# Patient Record
Sex: MALE | Race: WHITE | Employment: FULL TIME | URBAN - METROPOLITAN AREA
[De-identification: names, ages, dates, MRNs, and addresses within clinical notes are randomized per-mention and may not be internally consistent; named-entity substitution may affect disease eponyms.]

---

## 2019-04-07 ENCOUNTER — APPOINTMENT (OUTPATIENT)
Dept: ULTRASOUND IMAGING | Age: 39
End: 2019-04-07
Attending: PHYSICIAN ASSISTANT
Payer: COMMERCIAL

## 2019-04-07 ENCOUNTER — HOSPITAL ENCOUNTER (EMERGENCY)
Age: 39
Discharge: HOME | End: 2019-04-07
Attending: EMERGENCY MEDICINE | Admitting: EMERGENCY MEDICINE
Payer: COMMERCIAL

## 2019-04-07 VITALS
BODY MASS INDEX: 28.57 KG/M2 | TEMPERATURE: 99 F | DIASTOLIC BLOOD PRESSURE: 64 MMHG | OXYGEN SATURATION: 100 % | HEIGHT: 69 IN | RESPIRATION RATE: 12 BRPM | SYSTOLIC BLOOD PRESSURE: 104 MMHG | WEIGHT: 192.9 LBS | HEART RATE: 90 BPM

## 2019-04-07 DIAGNOSIS — L03.314 CELLULITIS OF RIGHT GROIN: Primary | ICD-10-CM

## 2019-04-07 DIAGNOSIS — I86.1 BILATERAL VARICOCELES: ICD-10-CM

## 2019-04-07 LAB
ALBUMIN SERPL-MCNC: 3.9 G/DL (ref 3.5–5)
ALBUMIN/GLOB SERPL: 1.1 {RATIO} (ref 1.1–2.2)
ALP SERPL-CCNC: 59 U/L (ref 45–117)
ALT SERPL-CCNC: 30 U/L (ref 12–78)
ANION GAP SERPL CALC-SCNC: 5 MMOL/L (ref 5–15)
APPEARANCE UR: CLEAR
AST SERPL-CCNC: 21 U/L (ref 15–37)
BACTERIA URNS QL MICRO: NEGATIVE /HPF
BASOPHILS # BLD: 0 K/UL (ref 0–0.1)
BASOPHILS NFR BLD: 0 % (ref 0–1)
BILIRUB SERPL-MCNC: 1.4 MG/DL (ref 0.2–1)
BILIRUB UR QL: NEGATIVE
BUN SERPL-MCNC: 10 MG/DL (ref 6–20)
BUN/CREAT SERPL: 10 (ref 12–20)
CALCIUM SERPL-MCNC: 8.7 MG/DL (ref 8.5–10.1)
CHLORIDE SERPL-SCNC: 106 MMOL/L (ref 97–108)
CO2 SERPL-SCNC: 27 MMOL/L (ref 21–32)
COLOR UR: NORMAL
CREAT SERPL-MCNC: 1.01 MG/DL (ref 0.7–1.3)
DIFFERENTIAL METHOD BLD: ABNORMAL
EOSINOPHIL # BLD: 0 K/UL (ref 0–0.4)
EOSINOPHIL NFR BLD: 0 % (ref 0–7)
EPITH CASTS URNS QL MICRO: NORMAL /LPF
ERYTHROCYTE [DISTWIDTH] IN BLOOD BY AUTOMATED COUNT: 14.6 % (ref 11.5–14.5)
GLOBULIN SER CALC-MCNC: 3.6 G/DL (ref 2–4)
GLUCOSE SERPL-MCNC: 95 MG/DL (ref 65–100)
GLUCOSE UR STRIP.AUTO-MCNC: NEGATIVE MG/DL
HCT VFR BLD AUTO: 42.1 % (ref 36.6–50.3)
HGB BLD-MCNC: 13.5 G/DL (ref 12.1–17)
HGB UR QL STRIP: NEGATIVE
IMM GRANULOCYTES # BLD AUTO: 0 K/UL (ref 0–0.04)
IMM GRANULOCYTES NFR BLD AUTO: 0 % (ref 0–0.5)
KETONES UR QL STRIP.AUTO: NEGATIVE MG/DL
LEUKOCYTE ESTERASE UR QL STRIP.AUTO: NEGATIVE
LYMPHOCYTES # BLD: 0.8 K/UL (ref 0.8–3.5)
LYMPHOCYTES NFR BLD: 7 % (ref 12–49)
MCH RBC QN AUTO: 29.5 PG (ref 26–34)
MCHC RBC AUTO-ENTMCNC: 32.1 G/DL (ref 30–36.5)
MCV RBC AUTO: 92.1 FL (ref 80–99)
MONOCYTES # BLD: 1.1 K/UL (ref 0–1)
MONOCYTES NFR BLD: 10 % (ref 5–13)
NEUTS SEG # BLD: 8.9 K/UL (ref 1.8–8)
NEUTS SEG NFR BLD: 83 % (ref 32–75)
NITRITE UR QL STRIP.AUTO: NEGATIVE
NRBC # BLD: 0 K/UL (ref 0–0.01)
NRBC BLD-RTO: 0 PER 100 WBC
PH UR STRIP: 7 [PH] (ref 5–8)
PLATELET # BLD AUTO: 186 K/UL (ref 150–400)
PMV BLD AUTO: 8.9 FL (ref 8.9–12.9)
POTASSIUM SERPL-SCNC: 4 MMOL/L (ref 3.5–5.1)
PROT SERPL-MCNC: 7.5 G/DL (ref 6.4–8.2)
PROT UR STRIP-MCNC: NEGATIVE MG/DL
RBC # BLD AUTO: 4.57 M/UL (ref 4.1–5.7)
RBC #/AREA URNS HPF: NORMAL /HPF (ref 0–5)
SODIUM SERPL-SCNC: 138 MMOL/L (ref 136–145)
SP GR UR REFRACTOMETRY: 1.01 (ref 1–1.03)
UA: UC IF INDICATED,UAUC: NORMAL
UROBILINOGEN UR QL STRIP.AUTO: 0.2 EU/DL (ref 0.2–1)
WBC # BLD AUTO: 10.8 K/UL (ref 4.1–11.1)
WBC URNS QL MICRO: NORMAL /HPF (ref 0–4)

## 2019-04-07 PROCEDURE — 80053 COMPREHEN METABOLIC PANEL: CPT

## 2019-04-07 PROCEDURE — 81001 URINALYSIS AUTO W/SCOPE: CPT

## 2019-04-07 PROCEDURE — 99283 EMERGENCY DEPT VISIT LOW MDM: CPT

## 2019-04-07 PROCEDURE — 76870 US EXAM SCROTUM: CPT

## 2019-04-07 PROCEDURE — 36415 COLL VENOUS BLD VENIPUNCTURE: CPT

## 2019-04-07 PROCEDURE — 87491 CHLMYD TRACH DNA AMP PROBE: CPT

## 2019-04-07 PROCEDURE — 85025 COMPLETE CBC W/AUTO DIFF WBC: CPT

## 2019-04-07 RX ORDER — SERTRALINE HYDROCHLORIDE 100 MG/1
TABLET, FILM COATED ORAL DAILY
COMMUNITY

## 2019-04-07 RX ORDER — CEPHALEXIN 500 MG/1
500 CAPSULE ORAL 4 TIMES DAILY
Qty: 40 CAP | Refills: 0 | Status: SHIPPED | OUTPATIENT
Start: 2019-04-07 | End: 2019-04-17

## 2019-04-07 RX ORDER — CETIRIZINE HCL 10 MG
TABLET ORAL
COMMUNITY

## 2019-04-07 RX ORDER — IBUPROFEN 800 MG/1
800 TABLET ORAL
Qty: 20 TAB | Refills: 0 | Status: SHIPPED | OUTPATIENT
Start: 2019-04-07 | End: 2019-04-14

## 2019-04-07 NOTE — ED PROVIDER NOTES
EMERGENCY DEPARTMENT HISTORY AND PHYSICAL EXAM      Date: 4/7/2019  Patient Name: Leigh Ann Johnson    History of Presenting Illness     Chief Complaint   Patient presents with    Groin Pain     Ambulatory into the ED with c/o pain, swelling, redness to Rt groin and penis x two days, worse yesterday. Denies testicular swelling.  Penis Pain       History Provided By: Patient    HPI: Leigh Ann Johnson, 41 y.o. male presents ambulatory to the ED with cc of 2 days of 3 out of 10 constant aching right groin pain, swelling and redness that is worse with palpation and not associated with fever or injury. He tells me the swelling and redness have moved to the right side of his penis, however denies any dysuria, urgency, frequency or urethral discharge. He denies any scrotal swelling or tenderness. He tells me he thought it was an infected hair and did not think much of it for the past couple of days, however his symptoms have persisted and now he is concerned. There are no other complaints, changes, or physical findings at this time. PCP: Betty Bean MD    Current Outpatient Medications   Medication Sig Dispense Refill    sertraline (ZOLOFT) 100 mg tablet Take  by mouth daily.  cetirizine (ZYRTEC) 10 mg tablet Take  by mouth.  cephALEXin (KEFLEX) 500 mg capsule Take 1 Cap by mouth four (4) times daily for 10 days. 40 Cap 0    ibuprofen (MOTRIN) 800 mg tablet Take 1 Tab by mouth every eight (8) hours as needed for Pain for up to 7 days. 20 Tab 0     Past History     Past Medical History:  No past medical history on file. Past Surgical History:  No past surgical history on file. Family History:  No family history on file. Social History:  Social History     Tobacco Use    Smoking status: Not on file   Substance Use Topics    Alcohol use: Not on file    Drug use: Not on file       Allergies:   Allergies   Allergen Reactions    Pcn [Penicillins] Rash     Review of Systems Review of Systems   Constitutional: Negative for fatigue and fever. HENT: Negative for congestion, ear pain and rhinorrhea. Eyes: Negative for pain and redness. Respiratory: Negative for cough and wheezing. Cardiovascular: Negative for chest pain and palpitations. Gastrointestinal: Negative for abdominal pain, nausea and vomiting. Genitourinary: Negative for dysuria, frequency and urgency. Musculoskeletal: Negative for back pain, neck pain and neck stiffness. Skin: Negative for rash and wound. Redness, pain and swelling of the right groin affecting the penis   Neurological: Negative for weakness, light-headedness, numbness and headaches. Physical Exam   Physical Exam   Constitutional: He is oriented to person, place, and time. He appears well-developed and well-nourished. Non-toxic appearance. No distress. HENT:   Head: Normocephalic and atraumatic. Head is without right periorbital erythema and without left periorbital erythema. Right Ear: External ear normal.   Left Ear: External ear normal.   Nose: Nose normal.   Mouth/Throat: Uvula is midline. No trismus in the jaw. Eyes: Pupils are equal, round, and reactive to light. Conjunctivae and EOM are normal. No scleral icterus. Neck: Normal range of motion and full passive range of motion without pain. Cardiovascular: Normal rate, regular rhythm and normal heart sounds. Pulmonary/Chest: Effort normal and breath sounds normal. No accessory muscle usage. No tachypnea. No respiratory distress. He has no decreased breath sounds. He has no wheezes. Abdominal: Soft. There is no tenderness. There is no rigidity and no guarding. Genitourinary:         Genitourinary Comments: Small area of tender redness without induration or fluctuance of the right groin. No definite swelling or redness of the penis. There is no penile tenderness. No scrotal redness, swelling or tenderness. Musculoskeletal: Normal range of motion. Neurological: He is alert and oriented to person, place, and time. He is not disoriented. No cranial nerve deficit or sensory deficit. GCS eye subscore is 4. GCS verbal subscore is 5. GCS motor subscore is 6. Skin: Skin is intact. No rash noted. Psychiatric: He has a normal mood and affect. His speech is normal.   Nursing note and vitals reviewed. Diagnostic Study Results     Labs -     Recent Results (from the past 12 hour(s))   URINALYSIS W/ REFLEX CULTURE    Collection Time: 04/07/19 11:42 AM   Result Value Ref Range    Color YELLOW/STRAW      Appearance CLEAR CLEAR      Specific gravity 1.011 1.003 - 1.030      pH (UA) 7.0 5.0 - 8.0      Protein NEGATIVE  NEG mg/dL    Glucose NEGATIVE  NEG mg/dL    Ketone NEGATIVE  NEG mg/dL    Bilirubin NEGATIVE  NEG      Blood NEGATIVE  NEG      Urobilinogen 0.2 0.2 - 1.0 EU/dL    Nitrites NEGATIVE  NEG      Leukocyte Esterase NEGATIVE  NEG      WBC 0-4 0 - 4 /hpf    RBC 0-5 0 - 5 /hpf    Epithelial cells FEW FEW /lpf    Bacteria NEGATIVE  NEG /hpf    UA:UC IF INDICATED CULTURE NOT INDICATED BY UA RESULT CNI     CBC WITH AUTOMATED DIFF    Collection Time: 04/07/19  1:17 PM   Result Value Ref Range    WBC 10.8 4.1 - 11.1 K/uL    RBC 4.57 4.10 - 5.70 M/uL    HGB 13.5 12.1 - 17.0 g/dL    HCT 42.1 36.6 - 50.3 %    MCV 92.1 80.0 - 99.0 FL    MCH 29.5 26.0 - 34.0 PG    MCHC 32.1 30.0 - 36.5 g/dL    RDW 14.6 (H) 11.5 - 14.5 %    PLATELET 126 655 - 393 K/uL    MPV 8.9 8.9 - 12.9 FL    NRBC 0.0 0  WBC    ABSOLUTE NRBC 0.00 0.00 - 0.01 K/uL    NEUTROPHILS 83 (H) 32 - 75 %    LYMPHOCYTES 7 (L) 12 - 49 %    MONOCYTES 10 5 - 13 %    EOSINOPHILS 0 0 - 7 %    BASOPHILS 0 0 - 1 %    IMMATURE GRANULOCYTES 0 0.0 - 0.5 %    ABS. NEUTROPHILS 8.9 (H) 1.8 - 8.0 K/UL    ABS. LYMPHOCYTES 0.8 0.8 - 3.5 K/UL    ABS. MONOCYTES 1.1 (H) 0.0 - 1.0 K/UL    ABS. EOSINOPHILS 0.0 0.0 - 0.4 K/UL    ABS. BASOPHILS 0.0 0.0 - 0.1 K/UL    ABS. IMM.  GRANS. 0.0 0.00 - 0.04 K/UL    DF AUTOMATED METABOLIC PANEL, COMPREHENSIVE    Collection Time: 04/07/19  1:17 PM   Result Value Ref Range    Sodium 138 136 - 145 mmol/L    Potassium 4.0 3.5 - 5.1 mmol/L    Chloride 106 97 - 108 mmol/L    CO2 27 21 - 32 mmol/L    Anion gap 5 5 - 15 mmol/L    Glucose 95 65 - 100 mg/dL    BUN 10 6 - 20 MG/DL    Creatinine 1.01 0.70 - 1.30 MG/DL    BUN/Creatinine ratio 10 (L) 12 - 20      GFR est AA >60 >60 ml/min/1.73m2    GFR est non-AA >60 >60 ml/min/1.73m2    Calcium 8.7 8.5 - 10.1 MG/DL    Bilirubin, total 1.4 (H) 0.2 - 1.0 MG/DL    ALT (SGPT) 30 12 - 78 U/L    AST (SGOT) 21 15 - 37 U/L    Alk. phosphatase 59 45 - 117 U/L    Protein, total 7.5 6.4 - 8.2 g/dL    Albumin 3.9 3.5 - 5.0 g/dL    Globulin 3.6 2.0 - 4.0 g/dL    A-G Ratio 1.1 1.1 - 2.2         Radiologic Studies -   US SCROTUM/TESTICLES   Final Result   IMPRESSION:      Bilateral testicular varicoceles. Nonemergent outpatient urology consultation is recommended. CT Results  (Last 48 hours)    None        CXR Results  (Last 48 hours)    None        Medical Decision Making   I am the first provider for this patient. I reviewed the vital signs, available nursing notes, past medical history, past surgical history, family history and social history. Vital Signs-Reviewed the patient's vital signs. Patient Vitals for the past 12 hrs:   Temp Pulse Resp BP SpO2   04/07/19 1300    104/64 100 %   04/07/19 1200    110/69 100 %   04/07/19 1158    112/70    04/07/19 1119 99 °F (37.2 °C) 90 12 (!) 149/99 100 %     Pulse Oximetry Analysis - 100% on RA    Records Reviewed: Nursing Notes, Old Medical Records, Previous Radiology Studies and Previous Laboratory Studies    Provider Notes (Medical Decision Making):   DDx: Cellulitis, abscess, hernia, varicocele, spermatocele, UTI, STI    ED Course:   Initial assessment performed.  The patients presenting problems have been discussed, and they are in agreement with the care plan formulated and outlined with them. I have encouraged them to ask questions as they arise throughout their visit. Disposition:  Charge    PLAN:  1. Discharge Medication List as of 4/7/2019  2:25 PM      START taking these medications    Details   cephALEXin (KEFLEX) 500 mg capsule Take 1 Cap by mouth four (4) times daily for 10 days. , Normal, Disp-40 Cap, R-0      ibuprofen (MOTRIN) 800 mg tablet Take 1 Tab by mouth every eight (8) hours as needed for Pain for up to 7 days. , Normal, Disp-20 Tab, R-0         CONTINUE these medications which have NOT CHANGED    Details   sertraline (ZOLOFT) 100 mg tablet Take  by mouth daily. , Historical Med      cetirizine (ZYRTEC) 10 mg tablet Take  by mouth., Historical Med           2. Follow-up Information     Follow up With Specialties Details Why Contact Info    Leeann Wood MD [facility] Schedule an appointment as soon as possible for a visit PRIMARY CARE: follow up in 2 days for a wound check [id number] [facility]  [phone]      Ashlee Velásquez MD Urology Schedule an appointment as soon as possible for a visit UROLOGY: for any concerns regarding your diagnosis of varicoceles [id number] [address] [place] [address]  [phone]          Return to ED if worse     Diagnosis     Clinical Impression:   1. Cellulitis of right groin    2.  Bilateral varicoceles

## 2019-04-07 NOTE — ED NOTES
Jessica GALDAMEZ reviewed discharge instructions with the patient. The patient verbalized understanding. All questions and concerns were addressed. The patient declined a wheelchair and is discharged ambulatory in the care of family members with instructions and prescriptions in hand. Pt is alert and oriented x 4. Respirations are clear and unlabored.

## 2019-04-07 NOTE — ED NOTES
Patient presents with groin pain for approximately 3-4 days. States the pain started off as an ingrown hair feeling, but has progressed and he now has swelling and pain to the penis as well. Patient denies urinary symptoms and penile discharge. Patient states he had sexual intercourse with his wife on Tuesday. He states he goes to the gym multiple times a week and recently replaced a door.

## 2019-04-07 NOTE — LETTER
[facility] EMERGENCY DEPT 
[address] [place] [address] [address] [id number] [phone] Work/School Note Date: 4/7/2019 To Whom It May concern: 
 
Mario Lofton was seen and treated today in the emergency room by the following provider(s): 
Attending Provider: Clark Marte MD 
Physician Assistant: RUDOLPH Valladares. Mario Lofton may return to work on [id number]. Sincerely, RUDOLPH Moise

## 2019-04-08 LAB
C TRACH DNA SPEC QL NAA+PROBE: NEGATIVE
N GONORRHOEA DNA SPEC QL NAA+PROBE: NEGATIVE
SAMPLE TYPE: NORMAL
SERVICE CMNT-IMP: NORMAL
SPECIMEN SOURCE: NORMAL

## 2021-12-20 PROCEDURE — 96376 TX/PRO/DX INJ SAME DRUG ADON: CPT

## 2021-12-20 PROCEDURE — 99284 EMERGENCY DEPT VISIT MOD MDM: CPT

## 2021-12-20 PROCEDURE — 96374 THER/PROPH/DIAG INJ IV PUSH: CPT

## 2021-12-20 PROCEDURE — 96375 TX/PRO/DX INJ NEW DRUG ADDON: CPT

## 2021-12-21 ENCOUNTER — ANESTHESIA EVENT (OUTPATIENT)
Dept: SURGERY | Age: 41
End: 2021-12-21
Payer: COMMERCIAL

## 2021-12-21 ENCOUNTER — ANESTHESIA (OUTPATIENT)
Dept: SURGERY | Age: 41
End: 2021-12-21
Payer: COMMERCIAL

## 2021-12-21 ENCOUNTER — APPOINTMENT (OUTPATIENT)
Dept: GENERAL RADIOLOGY | Age: 41
End: 2021-12-21
Attending: SURGERY
Payer: COMMERCIAL

## 2021-12-21 ENCOUNTER — HOSPITAL ENCOUNTER (OUTPATIENT)
Age: 41
Setting detail: OBSERVATION
Discharge: HOME | End: 2021-12-23
Attending: STUDENT IN AN ORGANIZED HEALTH CARE EDUCATION/TRAINING PROGRAM | Admitting: SURGERY
Payer: COMMERCIAL

## 2021-12-21 ENCOUNTER — APPOINTMENT (OUTPATIENT)
Dept: ULTRASOUND IMAGING | Age: 41
End: 2021-12-21
Attending: STUDENT IN AN ORGANIZED HEALTH CARE EDUCATION/TRAINING PROGRAM
Payer: COMMERCIAL

## 2021-12-21 DIAGNOSIS — K81.0 ACUTE CHOLECYSTITIS: Primary | ICD-10-CM

## 2021-12-21 LAB
ALBUMIN SERPL-MCNC: 4 G/DL (ref 3.5–5)
ALBUMIN/GLOB SERPL: 1.3 {RATIO} (ref 1.1–2.2)
ALP SERPL-CCNC: 104 U/L (ref 45–117)
ALT SERPL-CCNC: 190 U/L (ref 12–78)
ANION GAP SERPL CALC-SCNC: 7 MMOL/L (ref 5–15)
APPEARANCE UR: ABNORMAL
AST SERPL-CCNC: 277 U/L (ref 15–37)
BACTERIA URNS QL MICRO: NEGATIVE /HPF
BASOPHILS # BLD: 0 K/UL (ref 0–0.1)
BASOPHILS NFR BLD: 0 % (ref 0–1)
BILIRUB SERPL-MCNC: 1.5 MG/DL (ref 0.2–1)
BILIRUB UR QL: NEGATIVE
BUN SERPL-MCNC: 14 MG/DL (ref 6–20)
BUN/CREAT SERPL: 12 (ref 12–20)
CALCIUM SERPL-MCNC: 9.5 MG/DL (ref 8.5–10.1)
CHLORIDE SERPL-SCNC: 105 MMOL/L (ref 97–108)
CO2 SERPL-SCNC: 28 MMOL/L (ref 21–32)
COLOR UR: ABNORMAL
COVID-19 RAPID TEST, COVR: NOT DETECTED
CREAT SERPL-MCNC: 1.13 MG/DL (ref 0.7–1.3)
DIFFERENTIAL METHOD BLD: ABNORMAL
EOSINOPHIL # BLD: 0.2 K/UL (ref 0–0.4)
EOSINOPHIL NFR BLD: 1 % (ref 0–7)
EPITH CASTS URNS QL MICRO: ABNORMAL /LPF
ERYTHROCYTE [DISTWIDTH] IN BLOOD BY AUTOMATED COUNT: 14.9 % (ref 11.5–14.5)
GLOBULIN SER CALC-MCNC: 3.1 G/DL (ref 2–4)
GLUCOSE SERPL-MCNC: 185 MG/DL (ref 65–100)
GLUCOSE UR STRIP.AUTO-MCNC: 250 MG/DL
HCT VFR BLD AUTO: 42.6 % (ref 36.6–50.3)
HGB BLD-MCNC: 13.6 G/DL (ref 12.1–17)
HGB UR QL STRIP: NEGATIVE
HYALINE CASTS URNS QL MICRO: ABNORMAL /LPF (ref 0–5)
IMM GRANULOCYTES # BLD AUTO: 0.1 K/UL (ref 0–0.04)
IMM GRANULOCYTES NFR BLD AUTO: 0 % (ref 0–0.5)
KETONES UR QL STRIP.AUTO: ABNORMAL MG/DL
LEUKOCYTE ESTERASE UR QL STRIP.AUTO: NEGATIVE
LIPASE SERPL-CCNC: 160 U/L (ref 73–393)
LYMPHOCYTES # BLD: 1.9 K/UL (ref 0.8–3.5)
LYMPHOCYTES NFR BLD: 14 % (ref 12–49)
MCH RBC QN AUTO: 29.1 PG (ref 26–34)
MCHC RBC AUTO-ENTMCNC: 31.9 G/DL (ref 30–36.5)
MCV RBC AUTO: 91.2 FL (ref 80–99)
MONOCYTES # BLD: 1.1 K/UL (ref 0–1)
MONOCYTES NFR BLD: 8 % (ref 5–13)
NEUTS SEG # BLD: 10.4 K/UL (ref 1.8–8)
NEUTS SEG NFR BLD: 77 % (ref 32–75)
NITRITE UR QL STRIP.AUTO: NEGATIVE
NRBC # BLD: 0 K/UL (ref 0–0.01)
NRBC BLD-RTO: 0 PER 100 WBC
PH UR STRIP: 8 [PH] (ref 5–8)
PLATELET # BLD AUTO: 198 K/UL (ref 150–400)
PMV BLD AUTO: 9.7 FL (ref 8.9–12.9)
POTASSIUM SERPL-SCNC: 3.5 MMOL/L (ref 3.5–5.1)
PROT SERPL-MCNC: 7.1 G/DL (ref 6.4–8.2)
PROT UR STRIP-MCNC: 30 MG/DL
RBC # BLD AUTO: 4.67 M/UL (ref 4.1–5.7)
RBC #/AREA URNS HPF: ABNORMAL /HPF (ref 0–5)
SODIUM SERPL-SCNC: 140 MMOL/L (ref 136–145)
SOURCE, COVRS: NORMAL
SP GR UR REFRACTOMETRY: 1.02 (ref 1–1.03)
TROPONIN-HIGH SENSITIVITY: 4 NG/L (ref 0–76)
UROBILINOGEN UR QL STRIP.AUTO: 1 EU/DL (ref 0.2–1)
WBC # BLD AUTO: 13.6 K/UL (ref 4.1–11.1)
WBC URNS QL MICRO: ABNORMAL /HPF (ref 0–4)

## 2021-12-21 PROCEDURE — 74011250636 HC RX REV CODE- 250/636: Performed by: STUDENT IN AN ORGANIZED HEALTH CARE EDUCATION/TRAINING PROGRAM

## 2021-12-21 PROCEDURE — 80053 COMPREHEN METABOLIC PANEL: CPT

## 2021-12-21 PROCEDURE — 74011000250 HC RX REV CODE- 250: Performed by: NURSE ANESTHETIST, CERTIFIED REGISTERED

## 2021-12-21 PROCEDURE — 47563 LAPARO CHOLECYSTECTOMY/GRAPH: CPT | Performed by: SURGERY

## 2021-12-21 PROCEDURE — G0378 HOSPITAL OBSERVATION PER HR: HCPCS

## 2021-12-21 PROCEDURE — 77030009403 HC ELECTRD ENDO MEGA -B: Performed by: SURGERY

## 2021-12-21 PROCEDURE — 96376 TX/PRO/DX INJ SAME DRUG ADON: CPT

## 2021-12-21 PROCEDURE — 76705 ECHO EXAM OF ABDOMEN: CPT

## 2021-12-21 PROCEDURE — 77030004813 HC CATH CHOLGM TELE -B: Performed by: SURGERY

## 2021-12-21 PROCEDURE — 77030002933 HC SUT MCRYL J&J -A: Performed by: SURGERY

## 2021-12-21 PROCEDURE — 74011250636 HC RX REV CODE- 250/636: Performed by: SURGERY

## 2021-12-21 PROCEDURE — 76010000149 HC OR TIME 1 TO 1.5 HR: Performed by: SURGERY

## 2021-12-21 PROCEDURE — 77030010513 HC APPL CLP LIG J&J -C: Performed by: SURGERY

## 2021-12-21 PROCEDURE — 74300 X-RAY BILE DUCTS/PANCREAS: CPT

## 2021-12-21 PROCEDURE — 74011000250 HC RX REV CODE- 250: Performed by: SURGERY

## 2021-12-21 PROCEDURE — 74011000636 HC RX REV CODE- 636: Performed by: SURGERY

## 2021-12-21 PROCEDURE — 77030013079 HC BLNKT BAIR HGGR 3M -A: Performed by: ANESTHESIOLOGY

## 2021-12-21 PROCEDURE — 81001 URINALYSIS AUTO W/SCOPE: CPT

## 2021-12-21 PROCEDURE — 74011250636 HC RX REV CODE- 250/636: Performed by: NURSE ANESTHETIST, CERTIFIED REGISTERED

## 2021-12-21 PROCEDURE — 77030026438 HC STYL ET INTUB CARD -A: Performed by: ANESTHESIOLOGY

## 2021-12-21 PROCEDURE — 85025 COMPLETE CBC W/AUTO DIFF WBC: CPT

## 2021-12-21 PROCEDURE — 76060000033 HC ANESTHESIA 1 TO 1.5 HR: Performed by: SURGERY

## 2021-12-21 PROCEDURE — 2709999900 HC NON-CHARGEABLE SUPPLY: Performed by: SURGERY

## 2021-12-21 PROCEDURE — 84484 ASSAY OF TROPONIN QUANT: CPT

## 2021-12-21 PROCEDURE — 77030040361 HC SLV COMPR DVT MDII -B: Performed by: SURGERY

## 2021-12-21 PROCEDURE — 77030019908 HC STETH ESOPH SIMS -A: Performed by: ANESTHESIOLOGY

## 2021-12-21 PROCEDURE — 96374 THER/PROPH/DIAG INJ IV PUSH: CPT

## 2021-12-21 PROCEDURE — 77030002895 HC DEV VASC CLOSR COVD -B: Performed by: SURGERY

## 2021-12-21 PROCEDURE — 83690 ASSAY OF LIPASE: CPT

## 2021-12-21 PROCEDURE — 99218 PR INITIAL OBSERVATION CARE/DAY 30 MINUTES: CPT | Performed by: SURGERY

## 2021-12-21 PROCEDURE — 77030008684 HC TU ET CUF COVD -B: Performed by: ANESTHESIOLOGY

## 2021-12-21 PROCEDURE — 76210000006 HC OR PH I REC 0.5 TO 1 HR: Performed by: SURGERY

## 2021-12-21 PROCEDURE — 88304 TISSUE EXAM BY PATHOLOGIST: CPT

## 2021-12-21 PROCEDURE — 74011250637 HC RX REV CODE- 250/637: Performed by: STUDENT IN AN ORGANIZED HEALTH CARE EDUCATION/TRAINING PROGRAM

## 2021-12-21 PROCEDURE — 77030007955 HC PCH ENDOSC SPEC J&J -B: Performed by: SURGERY

## 2021-12-21 PROCEDURE — 77030008756 HC TU IRR SUC STRY -B: Performed by: SURGERY

## 2021-12-21 PROCEDURE — 93005 ELECTROCARDIOGRAM TRACING: CPT

## 2021-12-21 PROCEDURE — 87635 SARS-COV-2 COVID-19 AMP PRB: CPT

## 2021-12-21 PROCEDURE — 77030020829: Performed by: SURGERY

## 2021-12-21 PROCEDURE — 77030008606 HC TRCR ENDOSC KII AMR -B: Performed by: SURGERY

## 2021-12-21 PROCEDURE — 77030012770 HC TRCR OPT FX AMR -B: Performed by: SURGERY

## 2021-12-21 PROCEDURE — 36415 COLL VENOUS BLD VENIPUNCTURE: CPT

## 2021-12-21 PROCEDURE — 77030008771 HC TU NG SALEM SUMP -A: Performed by: ANESTHESIOLOGY

## 2021-12-21 PROCEDURE — 96375 TX/PRO/DX INJ NEW DRUG ADDON: CPT

## 2021-12-21 PROCEDURE — 96361 HYDRATE IV INFUSION ADD-ON: CPT

## 2021-12-21 RX ORDER — LORAZEPAM 2 MG/ML
1 INJECTION INTRAMUSCULAR
Status: DISCONTINUED | OUTPATIENT
Start: 2021-12-21 | End: 2021-12-23 | Stop reason: HOSPADM

## 2021-12-21 RX ORDER — SODIUM CHLORIDE 0.9 % (FLUSH) 0.9 %
5-40 SYRINGE (ML) INJECTION EVERY 8 HOURS
Status: DISCONTINUED | OUTPATIENT
Start: 2021-12-21 | End: 2021-12-22 | Stop reason: HOSPADM

## 2021-12-21 RX ORDER — LEVOFLOXACIN 5 MG/ML
500 INJECTION, SOLUTION INTRAVENOUS EVERY 24 HOURS
Status: DISCONTINUED | OUTPATIENT
Start: 2021-12-21 | End: 2021-12-21

## 2021-12-21 RX ORDER — SODIUM CHLORIDE 0.9 % (FLUSH) 0.9 %
5-40 SYRINGE (ML) INJECTION AS NEEDED
Status: DISCONTINUED | OUTPATIENT
Start: 2021-12-21 | End: 2021-12-22 | Stop reason: HOSPADM

## 2021-12-21 RX ORDER — SUCCINYLCHOLINE CHLORIDE 20 MG/ML
INJECTION INTRAMUSCULAR; INTRAVENOUS AS NEEDED
Status: DISCONTINUED | OUTPATIENT
Start: 2021-12-21 | End: 2021-12-21 | Stop reason: HOSPADM

## 2021-12-21 RX ORDER — DEXAMETHASONE SODIUM PHOSPHATE 4 MG/ML
INJECTION, SOLUTION INTRA-ARTICULAR; INTRALESIONAL; INTRAMUSCULAR; INTRAVENOUS; SOFT TISSUE AS NEEDED
Status: DISCONTINUED | OUTPATIENT
Start: 2021-12-21 | End: 2021-12-21 | Stop reason: HOSPADM

## 2021-12-21 RX ORDER — SODIUM CHLORIDE 0.9 % (FLUSH) 0.9 %
5-40 SYRINGE (ML) INJECTION EVERY 8 HOURS
Status: DISCONTINUED | OUTPATIENT
Start: 2021-12-21 | End: 2021-12-21 | Stop reason: HOSPADM

## 2021-12-21 RX ORDER — SODIUM CHLORIDE, SODIUM LACTATE, POTASSIUM CHLORIDE, CALCIUM CHLORIDE 600; 310; 30; 20 MG/100ML; MG/100ML; MG/100ML; MG/100ML
25 INJECTION, SOLUTION INTRAVENOUS CONTINUOUS
Status: DISCONTINUED | OUTPATIENT
Start: 2021-12-21 | End: 2021-12-21 | Stop reason: HOSPADM

## 2021-12-21 RX ORDER — ONDANSETRON 2 MG/ML
4 INJECTION INTRAMUSCULAR; INTRAVENOUS
Status: DISCONTINUED | OUTPATIENT
Start: 2021-12-21 | End: 2021-12-23 | Stop reason: HOSPADM

## 2021-12-21 RX ORDER — ONDANSETRON 2 MG/ML
4 INJECTION INTRAMUSCULAR; INTRAVENOUS
Status: COMPLETED | OUTPATIENT
Start: 2021-12-21 | End: 2021-12-21

## 2021-12-21 RX ORDER — GLYCOPYRROLATE 0.2 MG/ML
INJECTION INTRAMUSCULAR; INTRAVENOUS AS NEEDED
Status: DISCONTINUED | OUTPATIENT
Start: 2021-12-21 | End: 2021-12-21 | Stop reason: HOSPADM

## 2021-12-21 RX ORDER — HYDROMORPHONE HYDROCHLORIDE 1 MG/ML
0.5 INJECTION, SOLUTION INTRAMUSCULAR; INTRAVENOUS; SUBCUTANEOUS ONCE
Status: COMPLETED | OUTPATIENT
Start: 2021-12-21 | End: 2021-12-21

## 2021-12-21 RX ORDER — SODIUM CHLORIDE, SODIUM LACTATE, POTASSIUM CHLORIDE, CALCIUM CHLORIDE 600; 310; 30; 20 MG/100ML; MG/100ML; MG/100ML; MG/100ML
25 INJECTION, SOLUTION INTRAVENOUS CONTINUOUS
Status: DISCONTINUED | OUTPATIENT
Start: 2021-12-21 | End: 2021-12-22 | Stop reason: HOSPADM

## 2021-12-21 RX ORDER — DIPHENHYDRAMINE HYDROCHLORIDE 50 MG/ML
12.5 INJECTION, SOLUTION INTRAMUSCULAR; INTRAVENOUS AS NEEDED
Status: DISCONTINUED | OUTPATIENT
Start: 2021-12-21 | End: 2021-12-21 | Stop reason: HOSPADM

## 2021-12-21 RX ORDER — HYDROMORPHONE HYDROCHLORIDE 2 MG/ML
INJECTION, SOLUTION INTRAMUSCULAR; INTRAVENOUS; SUBCUTANEOUS AS NEEDED
Status: DISCONTINUED | OUTPATIENT
Start: 2021-12-21 | End: 2021-12-21 | Stop reason: HOSPADM

## 2021-12-21 RX ORDER — MORPHINE SULFATE 2 MG/ML
4 INJECTION, SOLUTION INTRAMUSCULAR; INTRAVENOUS ONCE
Status: COMPLETED | OUTPATIENT
Start: 2021-12-21 | End: 2021-12-21

## 2021-12-21 RX ORDER — LIDOCAINE HYDROCHLORIDE 20 MG/ML
INJECTION, SOLUTION EPIDURAL; INFILTRATION; INTRACAUDAL; PERINEURAL AS NEEDED
Status: DISCONTINUED | OUTPATIENT
Start: 2021-12-21 | End: 2021-12-21 | Stop reason: HOSPADM

## 2021-12-21 RX ORDER — ONDANSETRON 2 MG/ML
4 INJECTION INTRAMUSCULAR; INTRAVENOUS AS NEEDED
Status: DISCONTINUED | OUTPATIENT
Start: 2021-12-21 | End: 2021-12-21 | Stop reason: HOSPADM

## 2021-12-21 RX ORDER — PROPOFOL 10 MG/ML
INJECTION, EMULSION INTRAVENOUS AS NEEDED
Status: DISCONTINUED | OUTPATIENT
Start: 2021-12-21 | End: 2021-12-21 | Stop reason: HOSPADM

## 2021-12-21 RX ORDER — LIDOCAINE HYDROCHLORIDE 10 MG/ML
0.1 INJECTION, SOLUTION EPIDURAL; INFILTRATION; INTRACAUDAL; PERINEURAL AS NEEDED
Status: DISCONTINUED | OUTPATIENT
Start: 2021-12-21 | End: 2021-12-22 | Stop reason: HOSPADM

## 2021-12-21 RX ORDER — KETOROLAC TROMETHAMINE 30 MG/ML
INJECTION, SOLUTION INTRAMUSCULAR; INTRAVENOUS AS NEEDED
Status: DISCONTINUED | OUTPATIENT
Start: 2021-12-21 | End: 2021-12-21 | Stop reason: HOSPADM

## 2021-12-21 RX ORDER — FENTANYL CITRATE 50 UG/ML
INJECTION, SOLUTION INTRAMUSCULAR; INTRAVENOUS AS NEEDED
Status: DISCONTINUED | OUTPATIENT
Start: 2021-12-21 | End: 2021-12-21 | Stop reason: HOSPADM

## 2021-12-21 RX ORDER — FENTANYL CITRATE 50 UG/ML
25 INJECTION, SOLUTION INTRAMUSCULAR; INTRAVENOUS
Status: DISCONTINUED | OUTPATIENT
Start: 2021-12-21 | End: 2021-12-21 | Stop reason: HOSPADM

## 2021-12-21 RX ORDER — MORPHINE SULFATE 2 MG/ML
2 INJECTION, SOLUTION INTRAMUSCULAR; INTRAVENOUS
Status: DISCONTINUED | OUTPATIENT
Start: 2021-12-21 | End: 2021-12-21 | Stop reason: HOSPADM

## 2021-12-21 RX ORDER — ACETAMINOPHEN 325 MG/1
650 TABLET ORAL
Status: DISCONTINUED | OUTPATIENT
Start: 2021-12-21 | End: 2021-12-23 | Stop reason: HOSPADM

## 2021-12-21 RX ORDER — HYDROMORPHONE HYDROCHLORIDE 1 MG/ML
.2-.5 INJECTION, SOLUTION INTRAMUSCULAR; INTRAVENOUS; SUBCUTANEOUS
Status: DISCONTINUED | OUTPATIENT
Start: 2021-12-21 | End: 2021-12-21 | Stop reason: HOSPADM

## 2021-12-21 RX ORDER — SODIUM CHLORIDE 0.9 % (FLUSH) 0.9 %
5-40 SYRINGE (ML) INJECTION AS NEEDED
Status: DISCONTINUED | OUTPATIENT
Start: 2021-12-21 | End: 2021-12-21 | Stop reason: HOSPADM

## 2021-12-21 RX ORDER — ROCURONIUM BROMIDE 10 MG/ML
INJECTION, SOLUTION INTRAVENOUS AS NEEDED
Status: DISCONTINUED | OUTPATIENT
Start: 2021-12-21 | End: 2021-12-21 | Stop reason: HOSPADM

## 2021-12-21 RX ORDER — MIDAZOLAM HYDROCHLORIDE 1 MG/ML
INJECTION, SOLUTION INTRAMUSCULAR; INTRAVENOUS AS NEEDED
Status: DISCONTINUED | OUTPATIENT
Start: 2021-12-21 | End: 2021-12-21 | Stop reason: HOSPADM

## 2021-12-21 RX ORDER — DEXTROSE, SODIUM CHLORIDE, AND POTASSIUM CHLORIDE 5; .45; .15 G/100ML; G/100ML; G/100ML
100 INJECTION INTRAVENOUS CONTINUOUS
Status: DISCONTINUED | OUTPATIENT
Start: 2021-12-21 | End: 2021-12-23 | Stop reason: HOSPADM

## 2021-12-21 RX ORDER — BUPIVACAINE HYDROCHLORIDE 5 MG/ML
INJECTION, SOLUTION EPIDURAL; INTRACAUDAL AS NEEDED
Status: DISCONTINUED | OUTPATIENT
Start: 2021-12-21 | End: 2021-12-21 | Stop reason: HOSPADM

## 2021-12-21 RX ORDER — LEVOFLOXACIN 5 MG/ML
750 INJECTION, SOLUTION INTRAVENOUS EVERY 24 HOURS
Status: DISCONTINUED | OUTPATIENT
Start: 2021-12-21 | End: 2021-12-23 | Stop reason: HOSPADM

## 2021-12-21 RX ORDER — SODIUM CHLORIDE, SODIUM LACTATE, POTASSIUM CHLORIDE, CALCIUM CHLORIDE 600; 310; 30; 20 MG/100ML; MG/100ML; MG/100ML; MG/100ML
INJECTION, SOLUTION INTRAVENOUS
Status: DISCONTINUED | OUTPATIENT
Start: 2021-12-21 | End: 2021-12-21 | Stop reason: HOSPADM

## 2021-12-21 RX ORDER — HYDROMORPHONE HYDROCHLORIDE 1 MG/ML
0.5 INJECTION, SOLUTION INTRAMUSCULAR; INTRAVENOUS; SUBCUTANEOUS
Status: DISCONTINUED | OUTPATIENT
Start: 2021-12-21 | End: 2021-12-23 | Stop reason: HOSPADM

## 2021-12-21 RX ORDER — ONDANSETRON 2 MG/ML
INJECTION INTRAMUSCULAR; INTRAVENOUS AS NEEDED
Status: DISCONTINUED | OUTPATIENT
Start: 2021-12-21 | End: 2021-12-21 | Stop reason: HOSPADM

## 2021-12-21 RX ORDER — NEOSTIGMINE METHYLSULFATE 1 MG/ML
INJECTION, SOLUTION INTRAVENOUS AS NEEDED
Status: DISCONTINUED | OUTPATIENT
Start: 2021-12-21 | End: 2021-12-21 | Stop reason: HOSPADM

## 2021-12-21 RX ORDER — OXYCODONE HYDROCHLORIDE 5 MG/1
5 TABLET ORAL
Status: DISCONTINUED | OUTPATIENT
Start: 2021-12-21 | End: 2021-12-22

## 2021-12-21 RX ADMIN — ACETAMINOPHEN 650 MG: 325 TABLET ORAL at 13:37

## 2021-12-21 RX ADMIN — KETOROLAC TROMETHAMINE 30 MG: 30 INJECTION, SOLUTION INTRAMUSCULAR; INTRAVENOUS at 16:21

## 2021-12-21 RX ADMIN — ONDANSETRON 4 MG: 2 INJECTION INTRAMUSCULAR; INTRAVENOUS at 00:40

## 2021-12-21 RX ADMIN — GLUCAGON HYDROCHLORIDE 1 MG: KIT at 15:59

## 2021-12-21 RX ADMIN — LEVOFLOXACIN 750 MG: 5 INJECTION, SOLUTION INTRAVENOUS at 15:28

## 2021-12-21 RX ADMIN — Medication 3 MG: at 16:15

## 2021-12-21 RX ADMIN — DEXAMETHASONE SODIUM PHOSPHATE 4 MG: 4 INJECTION, SOLUTION INTRAMUSCULAR; INTRAVENOUS at 15:30

## 2021-12-21 RX ADMIN — HYDROMORPHONE HYDROCHLORIDE 0.4 MG: 2 INJECTION, SOLUTION INTRAMUSCULAR; INTRAVENOUS; SUBCUTANEOUS at 15:36

## 2021-12-21 RX ADMIN — HYDROMORPHONE HYDROCHLORIDE 0.2 MG: 2 INJECTION, SOLUTION INTRAMUSCULAR; INTRAVENOUS; SUBCUTANEOUS at 15:21

## 2021-12-21 RX ADMIN — SODIUM CHLORIDE, POTASSIUM CHLORIDE, SODIUM LACTATE AND CALCIUM CHLORIDE: 600; 310; 30; 20 INJECTION, SOLUTION INTRAVENOUS at 15:21

## 2021-12-21 RX ADMIN — MIDAZOLAM HYDROCHLORIDE 2 MG: 1 INJECTION, SOLUTION INTRAMUSCULAR; INTRAVENOUS at 15:21

## 2021-12-21 RX ADMIN — GLYCOPYRROLATE 0.5 MG: 0.2 INJECTION, SOLUTION INTRAMUSCULAR; INTRAVENOUS at 16:15

## 2021-12-21 RX ADMIN — LEVOFLOXACIN 750 MG: 5 INJECTION, SOLUTION INTRAVENOUS at 04:04

## 2021-12-21 RX ADMIN — POTASSIUM CHLORIDE, DEXTROSE MONOHYDRATE AND SODIUM CHLORIDE 125 ML/HR: 150; 5; 450 INJECTION, SOLUTION INTRAVENOUS at 02:49

## 2021-12-21 RX ADMIN — SUCCINYLCHOLINE CHLORIDE 140 MG: 20 INJECTION, SOLUTION INTRAMUSCULAR; INTRAVENOUS at 15:26

## 2021-12-21 RX ADMIN — SODIUM CHLORIDE, POTASSIUM CHLORIDE, SODIUM LACTATE AND CALCIUM CHLORIDE 500 ML: 600; 310; 30; 20 INJECTION, SOLUTION INTRAVENOUS at 00:41

## 2021-12-21 RX ADMIN — PROPOFOL 250 MG: 10 INJECTION, EMULSION INTRAVENOUS at 15:24

## 2021-12-21 RX ADMIN — PHENYLEPHRINE HYDROCHLORIDE 50 MCG: 10 INJECTION INTRAVENOUS at 15:44

## 2021-12-21 RX ADMIN — FENTANYL CITRATE 25 MCG: 50 INJECTION, SOLUTION INTRAMUSCULAR; INTRAVENOUS at 15:24

## 2021-12-21 RX ADMIN — HYDROMORPHONE HYDROCHLORIDE 0.5 MG: 1 INJECTION, SOLUTION INTRAMUSCULAR; INTRAVENOUS; SUBCUTANEOUS at 01:48

## 2021-12-21 RX ADMIN — ROCURONIUM BROMIDE 10 MG: 10 INJECTION INTRAVENOUS at 15:24

## 2021-12-21 RX ADMIN — ONDANSETRON HYDROCHLORIDE 4 MG: 2 INJECTION, SOLUTION INTRAMUSCULAR; INTRAVENOUS at 16:15

## 2021-12-21 RX ADMIN — HYDROMORPHONE HYDROCHLORIDE 0.5 MG: 1 INJECTION, SOLUTION INTRAMUSCULAR; INTRAVENOUS; SUBCUTANEOUS at 02:18

## 2021-12-21 RX ADMIN — ROCURONIUM BROMIDE 20 MG: 10 INJECTION INTRAVENOUS at 15:35

## 2021-12-21 RX ADMIN — POTASSIUM CHLORIDE, DEXTROSE MONOHYDRATE AND SODIUM CHLORIDE 125 ML/HR: 150; 5; 450 INJECTION, SOLUTION INTRAVENOUS at 19:09

## 2021-12-21 RX ADMIN — LIDOCAINE HYDROCHLORIDE 100 MG: 20 INJECTION, SOLUTION INTRAVENOUS at 15:24

## 2021-12-21 RX ADMIN — HYDROMORPHONE HYDROCHLORIDE 0.5 MG: 1 INJECTION, SOLUTION INTRAMUSCULAR; INTRAVENOUS; SUBCUTANEOUS at 06:35

## 2021-12-21 RX ADMIN — MORPHINE SULFATE 4 MG: 2 INJECTION, SOLUTION INTRAMUSCULAR; INTRAVENOUS at 00:42

## 2021-12-21 RX ADMIN — Medication 10 ML: at 22:33

## 2021-12-21 NOTE — H&P
Subjective:      Patient presents to the ER with severe RUQ pain that occurred after eating cheese crackers. Pain is sharp and stabbing and rated 10/10. Nothing makes the pain better. Contact makes the pain worse. Pain is associated with nausea. Patient denies fevers, chills, SOB, cough, and xhest pain. Patient has a 6 month history of progressive biliary symptoms. History reviewed. No pertinent past medical history. History reviewed. No pertinent family history. Prior to Admission Medications   Prescriptions Last Dose Informant Patient Reported? Taking? cetirizine (ZYRTEC) 10 mg tablet 12/20/2021  Yes No   Sig: Take  by mouth. sertraline (ZOLOFT) 100 mg tablet 12/20/2021  Yes No   Sig: Take  by mouth daily. Facility-Administered Medications: None     Allergies   Allergen Reactions    Pcn [Penicillins] Rash     Social History     Socioeconomic History    Marital status:      Spouse name: Not on file    Number of children: Not on file    Years of education: Not on file    Highest education level: Not on file   Occupational History    Not on file   Tobacco Use    Smoking status: Never Smoker    Smokeless tobacco: Never Used   Substance and Sexual Activity    Alcohol use: Yes     Comment: 8-10 drinks per week    Drug use: Never    Sexual activity: Not on file   Other Topics Concern    Not on file   Social History Narrative    Not on file     Social Determinants of Health     Financial Resource Strain:     Difficulty of Paying Living Expenses: Not on file   Food Insecurity:     Worried About Running Out of Food in the Last Year: Not on file    Anatoly of Food in the Last Year: Not on file   Transportation Needs:     Lack of Transportation (Medical): Not on file    Lack of Transportation (Non-Medical):  Not on file   Physical Activity:     Days of Exercise per Week: Not on file    Minutes of Exercise per Session: Not on file   Stress:     Feeling of Stress : Not on file   Social Connections:     Frequency of Communication with Friends and Family: Not on file    Frequency of Social Gatherings with Friends and Family: Not on file    Attends Catholic Services: Not on file    Active Member of Clubs or Organizations: Not on file    Attends Club or Organization Meetings: Not on file    Marital Status: Not on file   Intimate Partner Violence:     Fear of Current or Ex-Partner: Not on file    Emotionally Abused: Not on file    Physically Abused: Not on file    Sexually Abused: Not on file   Housing Stability:     Unable to Pay for Housing in the Last Year: Not on file    Number of [place] in the Last Year: Not on file    Unstable Housing in the Last Year: Not on file       Review of Systems: A comprehensive review of systems was negative except for that written in the HPI. Objective:        Visit Vitals  BP (!) 138/92 (BP 1 Location: Left upper arm, BP Patient Position: At rest)   Pulse 66   Temp 98.7 °F (37.1 °C)   Resp 11   Ht 5' 9\" (1.753 m)   Wt 85.3 kg (188 lb)   SpO2 96%   BMI 27.76 kg/m²        General appearance: alert, cooperative, no distress, appears stated age  Eyes: negative  Lungs: clear to auscultation bilaterally  Heart: regular rate and rhythm, S1, S2 normal, no murmur, click, rub or gallop  Abdomen: soft, tender in RUQ.  Bowel sounds normal. No masses,  no organomegaly    Imaging:  images and reports reviewed    Lab Review:      Recent Results (from the past 24 hour(s))   CBC WITH AUTOMATED DIFF    Collection Time: 12/21/21 12:20 AM   Result Value Ref Range    WBC 13.6 (H) 4.1 - 11.1 K/uL    RBC 4.67 4.10 - 5.70 M/uL    HGB 13.6 12.1 - 17.0 g/dL    HCT 42.6 36.6 - 50.3 %    MCV 91.2 80.0 - 99.0 FL    MCH 29.1 26.0 - 34.0 PG    MCHC 31.9 30.0 - 36.5 g/dL    RDW 14.9 (H) 11.5 - 14.5 %    PLATELET 687 183 - 604 K/uL    MPV 9.7 8.9 - 12.9 FL    NRBC 0.0 0  WBC    ABSOLUTE NRBC 0.00 0.00 - 0.01 K/uL    NEUTROPHILS 77 (H) 32 - 75 % LYMPHOCYTES 14 12 - 49 %    MONOCYTES 8 5 - 13 %    EOSINOPHILS 1 0 - 7 %    BASOPHILS 0 0 - 1 %    IMMATURE GRANULOCYTES 0 0.0 - 0.5 %    ABS. NEUTROPHILS 10.4 (H) 1.8 - 8.0 K/UL    ABS. LYMPHOCYTES 1.9 0.8 - 3.5 K/UL    ABS. MONOCYTES 1.1 (H) 0.0 - 1.0 K/UL    ABS. EOSINOPHILS 0.2 0.0 - 0.4 K/UL    ABS. BASOPHILS 0.0 0.0 - 0.1 K/UL    ABS. IMM. GRANS. 0.1 (H) 0.00 - 0.04 K/UL    DF AUTOMATED     METABOLIC PANEL, COMPREHENSIVE    Collection Time: 12/21/21 12:20 AM   Result Value Ref Range    Sodium 140 136 - 145 mmol/L    Potassium 3.5 3.5 - 5.1 mmol/L    Chloride 105 97 - 108 mmol/L    CO2 28 21 - 32 mmol/L    Anion gap 7 5 - 15 mmol/L    Glucose 185 (H) 65 - 100 mg/dL    BUN 14 6 - 20 MG/DL    Creatinine 1.13 0.70 - 1.30 MG/DL    BUN/Creatinine ratio 12 12 - 20      GFR est AA >60 >60 ml/min/1.73m2    GFR est non-AA >60 >60 ml/min/1.73m2    Calcium 9.5 8.5 - 10.1 MG/DL    Bilirubin, total 1.5 (H) 0.2 - 1.0 MG/DL    ALT (SGPT) 190 (H) 12 - 78 U/L    AST (SGOT) 277 (H) 15 - 37 U/L    Alk.  phosphatase 104 45 - 117 U/L    Protein, total 7.1 6.4 - 8.2 g/dL    Albumin 4.0 3.5 - 5.0 g/dL    Globulin 3.1 2.0 - 4.0 g/dL    A-G Ratio 1.3 1.1 - 2.2     LIPASE    Collection Time: 12/21/21 12:20 AM   Result Value Ref Range    Lipase 160 73 - 393 U/L   TROPONIN-HIGH SENSITIVITY    Collection Time: 12/21/21 12:20 AM   Result Value Ref Range    Troponin-High Sensitivity 4 0 - 76 ng/L   EKG, 12 LEAD, INITIAL    Collection Time: 12/21/21 12:34 AM   Result Value Ref Range    Ventricular Rate 57 BPM    Atrial Rate 57 BPM    P-R Interval 138 ms    QRS Duration 120 ms    Q-T Interval 452 ms    QTC Calculation (Bezet) 439 ms    Calculated P Axis -164 degrees    Calculated R Axis -177 degrees    Calculated T Axis 151 degrees    Diagnosis       ** Suspect arm lead reversal, interpretation assumes no reversal  Unusual P axis, possible ectopic atrial bradycardia  Right superior axis deviation  Nonspecific intraventricular conduction delay  Nonspecific ST and T wave abnormality  No previous ECGs available     URINALYSIS W/ RFLX MICROSCOPIC    Collection Time: 21  3:45 AM   Result Value Ref Range    Color YELLOW/STRAW      Appearance TURBID (A) CLEAR      Specific gravity 1.024 1.003 - 1.030      pH (UA) 8.0 5.0 - 8.0      Protein 30 (A) NEG mg/dL    Glucose 250 (A) NEG mg/dL    Ketone TRACE (A) NEG mg/dL    Bilirubin Negative NEG      Blood Negative NEG      Urobilinogen 1.0 0.2 - 1.0 EU/dL    Nitrites Negative NEG      Leukocyte Esterase Negative NEG      WBC 0-4 0 - 4 /hpf    RBC 0-5 0 - 5 /hpf    Epithelial cells FEW FEW /lpf    Bacteria Negative NEG /hpf    Hyaline cast 0-2 0 - 5 /lpf   COVID-19 RAPID TEST    Collection Time: 21  8:01 AM   Result Value Ref Range    Specimen source Nasopharyngeal      COVID-19 rapid test Not detected NOTD         Labs and radiology images and reports reviewed      Assessment:     Acute choelcystitis     Plan/Recommendations/Medical Decision Makin. I recommend laparoscopic cholecystectomy with possible cholangiogram  Treatment alternatives were discussed. 2. Discussed aspects of surgical intervention, methods, risks (including by not limited to infection, bleeding, retained gallstones in the abdominal cavity and/or the main bile ducts, and injury to adjacent structures including the biliary system, common bile duct, and intestines.)  The patient understands the risks, any and all questions were answered to the patient's satisfaction. 3. Patient does wish to proceed with surgery.

## 2021-12-21 NOTE — OP NOTES
Laparoscopic Cholecystectomy with IOC Procedure Note    Patient: Julia Solis  YOB: 1980  MRN: [id number]    Date of Procedure: 12/21/2021       Proceedure: laparoscopic cholecystectomy with Cholangiogram    Pre-operative Diagnosis: Acute cholecystitis    Post-operative Diagnosis:  Acute cholecystitis, Choledocolithasis    Surgeon: Surgeon(s):  Юлия Wiggins MD    Assistant:  Circ-1: Russel Bearden[place]: Faustino Bermeo RN  Radiology Technician: Isa Klein  Scrub Tech-1: Claude Bode  Surg Asst-1: Dashawn Franklin  Surg Asst-Relief: [facility]    Anesthesia: General     Indications: This patient presents with acute cholecystitis      Procedure Details   The patient was seen again in the Holding Room. The risks, benefits, complications, treatment options, and expected outcomes were discussed with the patient. The possibilities of reaction to medication, pulmonary aspiration, perforation of viscus, bleeding, recurrent infection, finding a normal gallbladder, the need for additional procedures, failure to diagnose a condition, the possible need to convert to an open procedure, and creating a complication requiring transfusion or operation were discussed with the patient. The patient and/or family concurred with the proposed plan, giving informed consent. The patient was taken to Operating Room, identified as Julia Solis and the procedure verified as Laparoscopic Cholecystectomy with possible Intraoperative Cholangiograms. A Time Out was held and the above information confirmed. Prior to the induction of general anesthesia,  antibiotic prophylaxis was administered. General endotracheal anesthesia was then administered and tolerated well. After the induction, the abdomen was prepped in the usual sterile fashion. The abdomen was entered in the right upper quadrant in the midclavicular line just below the costal margin.  At this site, 3 mL of 0.5% Marcaine was injected in the subcutaneous space. A 5-mm incision made with an 11-blade scalpel. Then a 5-mm Xcel trocar containing 5-mm 0-degree laparoscope was used to dissect through the layers of the abdominal wall under direct laparoscopic vision. Entry into the abdomen was confirmed visually. Once within the abdomen, insufflation was provided through this trocar to a pressure of 15 mmHg. The patient tolerated insufflation well and there were no apparent complications. A 360-degree evaluation of the abdomen was then performed from this trocar  site. There were no peritoneal implants identified. There were no abnormalities on the surface of the liver. Attention was then focused at the umbilicus. Marcaine plain 0.5% 3 mL was injected in the subcutaneous space, as well as the preperitoneal space. A 12-mm curvilinear incision was made at the base of the umbilicus, and then a 76-BB Xcel trocar was inserted under direct laparoscopic vision into the abdominal cavity. The camera was then switched to this trocar position. The patient was then placed in reverse Trendelenburg with right side up. Attention was focused at the right upper quadrant, and 2 additional trocars were placed. One 5-mm trocar was placed in the epigastrium just below the xyphyoid The third 5-mm trocar was placed in the anterior axillary line just below the costal margin. At both sites, 3 mL of 0.5% Marcaine was injected into the skin and the preperitoneal space, a 5-mm incision made, and then the 5-mm trocar inserted under direct laparoscopic vision. The fundus of the gallbladder was then grasped and retracted over the dome of the liver. The infundibulum was grasped and retracted to the right and inferiorly. Blunt dissection was used to dissect out the space between the infundibulum and the gallbladder bed, and then blunt dissection was used to dissect out the cystic duct and  cystic artery.  A critical view was obtained where these were the only 2 structures entering the gallbladder. Once this critical view was obtained, a clip was placed at the base of the infundibulum. A choledochotomy was made in the very distal cystic duct. Then a 14-gauge angiocatheter was used to introduce an Arrow cholangiogram catheter into the abdominal cavity. The catheter was irrigated with saline and then inserted into the choledochotomy. Once within the cystic duct, the balloon was inflated and the cystic duct irrigated with normal saline. There was no evidence of any leakage out through the choledochotomy, indicating good seal with the balloon. Of note, the saline irrigated easily without much resistance. All instruments were removed from the patient's abdomen. She was placed in a supine position and a C-arm was brought into the field. A  film was shot to obtain proper orientation and alignment of the C-arm, and then, under real-time fluoroscopy, contrast was injected slowly by hand. Contrast was seen flowing through the cystic duct and into a common bile duct. There was no filling defect seen in the common bile duct and the duct was not dilated. Contrast easily refluxed up the common hepatic duct and into right and left hepatic ducts and into secondary and tertiary  biliary radicles within the liver without signs of filling defects. There was no flow in the duodenum. Patient was given glucagon by anesthesia. After 2 minute circulation time, the biliary tree was irrigated with NS and a repeat cholangiogram was obtained. There was still no flow in the duodenum. The C-arm was removed, the cholangiogram catheter removed. Two clips were  placed on the proximal cystic duct and then the choledochotomy  completed with laparoscopic scissors. The artery was clipped and divided in a similar fashion.       The infundibulum of the gallbladder was then retracted towards the anterior abdominal wall and the gallbladder removed from the gallbladder fossa with electrocautery. The gallbladder was then placed over the right lobe of the liver. An Endocatch bag was then inserted through the umbilical trochar. The gallbladder was then placed in the EndoCatch bag and removed through the umbilical trocar site. The gallbladder was then sent to Pathology. Next, the right upper quadrant was inspected. The gallbladder fossa appeared dry. There was no evidence of any bleeding. The cystic duct remnant had 2 clips across it, and an Endoloop, with no evidence of any leakage of bile. The cystic artery remnant was inspected. It had 2 clips across it with no evidence of any bleeding. The right upper quadrant was then irrigated with 300 mL of normal saline. The irrigation came back  clear. Attention was then focused on the umbilical trocar site, and the fascia at  this umbilical trocar site was closed with a transfascial sutures of 0  Vicryl. This was done using an Endo Close device under direct laparoscopic  vision. Once this suture was tied, there was no loss of pneumoperitoneum through the umbilical trocar site and no palpable defect, indicating adequate closure of the trocar site. Pneumoinsufflation was then vented through the 5-mm trocar sites. The trocars were then removed and the skin at all trocar sites closed with 4-0 Monocryl and Dermabond. The patient was extubated in the room and taken to the [address] [place] [address]  Unit in stable condition. Instrument, sponge, and needle counts were correct x2. Findings:  Cholecystitis with Cholelithiasis  Cholangiogram with distal cut off,  Did not improve with glucagon      Estimated Blood Loss:  Minimal            Drains: none           Specimens: Gallbladder             Complications:  None; patient tolerated the procedure well. Implants: * No implants in log *    Disposition: PACU - hemodynamically stable.            Condition: stable      Electronically Signed by Arjun Yeung MD on 12/21/2021 at 4:15 PM

## 2021-12-21 NOTE — PROGRESS NOTES
Problem: Falls - Risk of  Goal: *Absence of Falls  Description: Document Benton Solitario Fall Risk and appropriate interventions in the flowsheet.   Outcome: Progressing Towards Goal  Note: Fall Risk Interventions:       Mentation Interventions: Adequate sleep, hydration, pain control                        Problem: Patient Education: Go to Patient Education Activity  Goal: Patient/Family Education  Outcome: Progressing Towards Goal     Problem: Pain  Goal: *Control of Pain  Outcome: Progressing Towards Goal     Problem: Patient Education: Go to Patient Education Activity  Goal: Patient/Family Education  Outcome: Progressing Towards Goal

## 2021-12-21 NOTE — PERIOP NOTES
TRANSFER - IN REPORT:    Verbal report received from JERI Rodriguez on Heaven Arana  being received from [id number] for ordered procedure      Report consisted of patients Situation, Background, Assessment and   Recommendations(SBAR). Information from the following report(s) SBAR, Kardex, Intake/Output, MAR and Recent Results was reviewed with the receiving nurse. Opportunity for questions and clarification was provided. Assessment completed upon patients arrival to unit and care assumed.

## 2021-12-21 NOTE — ED PROVIDER NOTES
EMERGENCY DEPARTMENT HISTORY AND PHYSICAL EXAM          Date: 12/21/2021  Patient Name: Drew Briscoe  Attending of Record: Dr. Annmarie Tejeda    History of Presenting Illness     Chief Complaint   Patient presents with    Abdominal Pain       History Provided By: Patient    HPI: Drew Briscoe is a 37 y.o. male, who presents with abdominal pain. He reports epigastric and substernal CP that is intermittent and has occurred for the last 6 months. He recently saw GI as an outpatient who did an upper endoscopy and RUQ US 1.5 weeks ago, which revealed cholelithiasis. He says that earlier this evening his pain increased acutely after he ate cheese crackers and a bowl of cereal. This is associated with nausea and multiple episodes of emesis. He denies fevers, diarrhea, constipation, blood in stool, hematemesis, and testicular pain or testicular injury. PCP: Edmond Raza MD    There are no other complaints, changes, or physical findings at this time. Current Outpatient Medications   Medication Sig Dispense Refill    sertraline (ZOLOFT) 100 mg tablet Take  by mouth daily.  cetirizine (ZYRTEC) 10 mg tablet Take  by mouth. Past History     Past Medical History:  History reviewed. No pertinent past medical history. Past Surgical History:  History reviewed. No pertinent surgical history. Family History:  History reviewed. No pertinent family history. Social History:  Social History     Tobacco Use    Smoking status: Not on file    Smokeless tobacco: Not on file   Substance Use Topics    Alcohol use: Not on file    Drug use: Not on file       Allergies: Allergies   Allergen Reactions    Pcn [Penicillins] Rash         Review of Systems   Review of Systems   Constitutional: Positive for appetite change. HENT: Negative. Eyes: Negative. Respiratory: Negative. Cardiovascular: Positive for chest pain. Gastrointestinal: Positive for abdominal pain, nausea and vomiting.  Negative for blood in stool, constipation and diarrhea. Genitourinary: Negative. Musculoskeletal: Negative. Neurological: Negative. Psychiatric/Behavioral: Negative. Physical Exam   Physical Exam  Constitutional:       Appearance: He is well-developed. HENT:      Head: Normocephalic. Eyes:      General: No scleral icterus. Extraocular Movements: Extraocular movements intact. Cardiovascular:      Rate and Rhythm: Normal rate and regular rhythm. Heart sounds: Normal heart sounds. Pulmonary:      Effort: Pulmonary effort is normal.      Breath sounds: Normal breath sounds. Abdominal:      General: Abdomen is flat. Palpations: Abdomen is soft. Tenderness: There is abdominal tenderness in the right upper quadrant, epigastric area and periumbilical area. There is no guarding or rebound. Negative signs include Archuleta's sign, Rovsing's sign and McBurney's sign. Skin:     General: Skin is warm and dry. Neurological:      General: No focal deficit present. Mental Status: He is alert and oriented to person, place, and time. Psychiatric:         Mood and Affect: Mood normal.         Behavior: Behavior normal.         Diagnostic Study Results     Labs -     Recent Results (from the past 12 hour(s))   CBC WITH AUTOMATED DIFF    Collection Time: 12/21/21 12:20 AM   Result Value Ref Range    WBC 13.6 (H) 4.1 - 11.1 K/uL    RBC 4.67 4.10 - 5.70 M/uL    HGB 13.6 12.1 - 17.0 g/dL    HCT 42.6 36.6 - 50.3 %    MCV 91.2 80.0 - 99.0 FL    MCH 29.1 26.0 - 34.0 PG    MCHC 31.9 30.0 - 36.5 g/dL    RDW 14.9 (H) 11.5 - 14.5 %    PLATELET 018 324 - 540 K/uL    MPV 9.7 8.9 - 12.9 FL    NRBC 0.0 0  WBC    ABSOLUTE NRBC 0.00 0.00 - 0.01 K/uL    NEUTROPHILS 77 (H) 32 - 75 %    LYMPHOCYTES 14 12 - 49 %    MONOCYTES 8 5 - 13 %    EOSINOPHILS 1 0 - 7 %    BASOPHILS 0 0 - 1 %    IMMATURE GRANULOCYTES 0 0.0 - 0.5 %    ABS. NEUTROPHILS 10.4 (H) 1.8 - 8.0 K/UL    ABS.  LYMPHOCYTES 1.9 0.8 - 3.5 K/UL ABS. MONOCYTES 1.1 (H) 0.0 - 1.0 K/UL    ABS. EOSINOPHILS 0.2 0.0 - 0.4 K/UL    ABS. BASOPHILS 0.0 0.0 - 0.1 K/UL    ABS. IMM. GRANS. 0.1 (H) 0.00 - 0.04 K/UL    DF AUTOMATED     METABOLIC PANEL, COMPREHENSIVE    Collection Time: 12/21/21 12:20 AM   Result Value Ref Range    Sodium 140 136 - 145 mmol/L    Potassium 3.5 3.5 - 5.1 mmol/L    Chloride 105 97 - 108 mmol/L    CO2 28 21 - 32 mmol/L    Anion gap 7 5 - 15 mmol/L    Glucose 185 (H) 65 - 100 mg/dL    BUN 14 6 - 20 MG/DL    Creatinine 1.13 0.70 - 1.30 MG/DL    BUN/Creatinine ratio 12 12 - 20      GFR est AA >60 >60 ml/min/1.73m2    GFR est non-AA >60 >60 ml/min/1.73m2    Calcium 9.5 8.5 - 10.1 MG/DL    Bilirubin, total 1.5 (H) 0.2 - 1.0 MG/DL    ALT (SGPT) 190 (H) 12 - 78 U/L    AST (SGOT) 277 (H) 15 - 37 U/L    Alk. phosphatase 104 45 - 117 U/L    Protein, total 7.1 6.4 - 8.2 g/dL    Albumin 4.0 3.5 - 5.0 g/dL    Globulin 3.1 2.0 - 4.0 g/dL    A-G Ratio 1.3 1.1 - 2.2     LIPASE    Collection Time: 12/21/21 12:20 AM   Result Value Ref Range    Lipase 160 73 - 393 U/L   TROPONIN-HIGH SENSITIVITY    Collection Time: 12/21/21 12:20 AM   Result Value Ref Range    Troponin-High Sensitivity 4 0 - 76 ng/L   EKG, 12 LEAD, INITIAL    Collection Time: 12/21/21 12:34 AM   Result Value Ref Range    Ventricular Rate 57 BPM    Atrial Rate 57 BPM    P-R Interval 138 ms    QRS Duration 120 ms    Q-T Interval 452 ms    QTC Calculation (Bezet) 439 ms    Calculated P Axis -164 degrees    Calculated R Axis -177 degrees    Calculated T Axis 151 degrees    Diagnosis       ** Suspect arm lead reversal, interpretation assumes no reversal  Unusual P axis, possible ectopic atrial bradycardia  Right superior axis deviation  Nonspecific intraventricular conduction delay  Nonspecific ST and T wave abnormality  No previous ECGs available         Radiologic Studies -   US ABD LTD   Final Result   Cholelithiasis and diffuse gallbladder wall thickening. No biliary duct   dilatation. Findings can be seen with acute cholecystitis in the appropriate   clinical setting. CT Results  (Last 48 hours)    None        CXR Results  (Last 48 hours)    None            Medical Decision Making   I am the first provider for this patient. I reviewed the vital signs, available nursing notes, past medical history, past surgical history, family history and social history. Vital Signs-Reviewed the patient's vital signs. Patient Vitals for the past 12 hrs:   Temp Pulse Resp BP SpO2   12/21/21 0003 97.8 °F (36.6 °C) 70 17 (!) 161/108 100 %         Records Reviewed: Nursing Notes and Old Medical Records    Provider Notes (Medical Decision Making):   DDx: Presentation concerning for biliary colic vs acute cholecystitis. This is supported by his report of outpatient US that revealed cholelithiasis in the setting of acute onset abdominal pain, nausea, and vomiting today. Patient is however afebrile making infection less likely. Gastritis is also possible considering his report of substernal CP at rest associated with the abdominal pain. ACS must therefore also be considered, however less likely in the absence of cardiac CP. Other abdominal etiologies, such as appendicitis is less likely in the absence of RLQ pain on exam. Will get full panel of labs, EKG, and RUQ US. Will administer zofran, morphine, and IVF bolus and reassess. ED Course and Progress Notes:   Initial assessment performed. The patients presenting problems have been discussed, and they are in agreement with the care plan formulated and outlined with them. I have encouraged them to ask questions as they arise throughout their visit. ED Course as of 12/21/21 0205   Tue Dec 21, 2021   0130 US here. Pt reports no improvement with morphine, will administer dilaudid 0.5 mg. [BD]   0203 US shows GB wall thickening to 9mm. Labs reveal WBC 13, transaminitis, and total bili 1.5 Spoke to General Surgery who will admit patient.  No antibiotics advised for now. Discussed with patient who agrees with plan. [BD]      ED Course User Index  [BD] Mak Mcdermott MD         Diagnosis     Clinical Impression:   1.  Acute cholecystitis        Disposition:  Admission to General Surgery          Resident Signature: Lakshmi Tovar MD PGY-2

## 2021-12-21 NOTE — ANESTHESIA PREPROCEDURE EVALUATION
Relevant Problems   No relevant active problems       Anesthetic History   No history of anesthetic complications            Review of Systems / Medical History  Patient summary reviewed, nursing notes reviewed and pertinent labs reviewed    Pulmonary  Within defined limits                 Neuro/Psych   Within defined limits           Cardiovascular  Within defined limits                Exercise tolerance: >4 METS     GI/Hepatic/Renal  Within defined limits              Endo/Other  Within defined limits           Other Findings              Physical Exam    Airway  Mallampati: II  TM Distance: 4 - 6 cm  Neck ROM: normal range of motion   Mouth opening: Normal     Cardiovascular  Regular rate and rhythm,  S1 and S2 normal,  no murmur, click, rub, or gallop             Dental  No notable dental hx       Pulmonary  Breath sounds clear to auscultation               Abdominal  GI exam deferred       Other Findings            Anesthetic Plan    ASA: 1  Anesthesia type: general            Anesthetic plan and risks discussed with: Patient

## 2021-12-21 NOTE — PROGRESS NOTES
End of Shift Note    Bedside shift change report given to Gavin Ríos 64 (oncoming nurse) by Shahzad Ram RN (offgoing nurse). Report included the following information SBAR, Kardex, Intake/Output and MAR    Shift worked:  7P-7A     Shift summary and any significant changes:          Concerns for physician to address:       Zone phone for oncoming shift:   [phone]       Activity:  Activity Level: Up with Assistance  Number times ambulated in hallways past shift: 0  Number of times OOB to chair past shift: 0    Cardiac:   Cardiac Monitoring: No      Cardiac Rhythm: Sinus Rhythm    Access:   Current line(s): PIV     Genitourinary:   Urinary status: voiding    Respiratory:   O2 Device: None (Room air)  Chronic home O2 use?: NO  Incentive spirometer at bedside: NO     GI:  Last Bowel Movement Date: 12/20/21  Current diet:  DIET NPO  Passing flatus: YES  Tolerating current diet: YES       Pain Management:   Patient states pain is manageable on current regimen: YES    Skin:  Gab Score: 23  Interventions: float heels and increase time out of bed    Patient Safety:  Fall Score:  Total Score: 1  Interventions: assistive device (walker, cane, etc), gripper socks and pt to call before getting OOB       Length of Stay:  Expected LOS: - - -  Actual LOS: 0      Diogo Jimenez RN

## 2021-12-21 NOTE — PERIOP NOTES
Handoff Report from Operating Room to PACU  4:35 PM  Report received from FE Dumont RN and NILO Bowman CRNA regarding Arthur Plump. Surgeon(s):  Guido Wood MD  And Procedure(s) (LRB):  CHOLECYSTECTOMY LAPAROSCOPIC WITH INTRAOPERATIVE CHOLANGIOFRAM (N/A)  confirmed   with allergies and dressings discussed. Anesthesia type, drugs, patient history, complications, estimated blood loss, vital signs, intake and output, and last pain medication, lines, reversal medications and temperature were reviewed. 5:25 PM  TRANSFER - OUT REPORT:    Verbal report given to Floretta Hammans RN(name) on Arthur Plump  being transferred to Ortho(unit) for routine progression of care       Report consisted of patients Situation, Background, Assessment and   Recommendations(SBAR). Information from the following report(s) SBAR, Kardex, MAR and Accordion was reviewed with the receiving nurse. Lines:   Peripheral IV 12/21/21 Right Antecubital (Active)   Site Assessment Clean, dry, & intact 12/21/21 1637   Phlebitis Assessment 0 12/21/21 1637   Infiltration Assessment 0 12/21/21 1637   Dressing Status Clean, dry, & intact 12/21/21 1637   Dressing Type Transparent;Tape 12/21/21 1637   Hub Color/Line Status Pink; Infusing 12/21/21 1637   Action Taken Open ports on tubing capped 12/21/21 0800   Alcohol Cap Used Yes 12/21/21 0800        Opportunity for questions and clarification was provided.       Patient transported with:   [employer]

## 2021-12-21 NOTE — PROGRESS NOTES
Problem: Falls - Risk of  Goal: *Absence of Falls  Description: Document Alhaji Sol Fall Risk and appropriate interventions in the flowsheet.   Outcome: Progressing Towards Goal  Note: Fall Risk Interventions:       Mentation Interventions: Adequate sleep, hydration, pain control    Medication Interventions: Patient to call before getting OOB,Teach patient to arise slowly                   Problem: Patient Education: Go to Patient Education Activity  Goal: Patient/Family Education  Outcome: Progressing Towards Goal     Problem: Pain  Goal: *Control of Pain  Outcome: Progressing Towards Goal     Problem: Patient Education: Go to Patient Education Activity  Goal: Patient/Family Education  Outcome: Progressing Towards Goal

## 2021-12-21 NOTE — ANESTHESIA POSTPROCEDURE EVALUATION
Procedure(s):  CHOLECYSTECTOMY LAPAROSCOPIC WITH INTRAOPERATIVE CHOLANGIOFRAM.    general    Anesthesia Post Evaluation        Patient location during evaluation: PACU  Note status: Adequate. Level of consciousness: responsive to verbal stimuli and sleepy but conscious  Pain management: satisfactory to patient  Airway patency: patent  Anesthetic complications: no  Cardiovascular status: acceptable  Respiratory status: acceptable  Hydration status: acceptable  Comments: +Post-Anesthesia Evaluation and Assessment    Patient: Julia Solis MRN: [id number]  SSN: [id number]   YOB: 1980  Age: 37 y.o. Sex: male      Cardiovascular Function/Vital Signs    /71   Pulse 81   Temp 37.3 °C (99.1 °F)   Resp 15   Ht 5' 9\" (1.753 m)   Wt 85.3 kg (188 lb)   SpO2 97%   BMI 27.76 kg/m²     Patient is status post Procedure(s):  CHOLECYSTECTOMY LAPAROSCOPIC WITH INTRAOPERATIVE CHOLANGIOFRAM.    Nausea/Vomiting: Controlled. Postoperative hydration reviewed and adequate. Pain:  Pain Scale 1: Numeric (0 - 10) (12/21/21 1715)  Pain Intensity 1: 3 (12/21/21 1715)   Managed. Neurological Status:   Neuro (WDL): Exceptions to WDL (12/21/21 1637)   At baseline. Mental Status and Level of Consciousness: Arousable. Pulmonary Status:   O2 Device: Nasal cannula (12/21/21 1700)   Adequate oxygenation and airway patent. Complications related to anesthesia: None    Post-anesthesia assessment completed. No concerns. Signed By: Dario Haley MD    12/21/2021  Post anesthesia nausea and vomiting:  controlled      INITIAL Post-op Vital signs:   Vitals Value Taken Time   /71 12/21/21 1715   Temp 37.3 °C (99.1 °F) 12/21/21 1637   Pulse 76 12/21/21 1721   Resp 13 12/21/21 1721   SpO2 96 % 12/21/21 1721   Vitals shown include unvalidated device data.

## 2021-12-22 ENCOUNTER — APPOINTMENT (OUTPATIENT)
Dept: GENERAL RADIOLOGY | Age: 41
End: 2021-12-22
Attending: INTERNAL MEDICINE
Payer: COMMERCIAL

## 2021-12-22 ENCOUNTER — ANESTHESIA (OUTPATIENT)
Dept: SURGERY | Age: 41
End: 2021-12-22
Payer: COMMERCIAL

## 2021-12-22 ENCOUNTER — ANESTHESIA EVENT (OUTPATIENT)
Dept: SURGERY | Age: 41
End: 2021-12-22
Payer: COMMERCIAL

## 2021-12-22 LAB
ATRIAL RATE: 57 BPM
CALCULATED P AXIS, ECG09: -164 DEGREES
CALCULATED R AXIS, ECG10: -177 DEGREES
CALCULATED T AXIS, ECG11: 151 DEGREES
DIAGNOSIS, 93000: NORMAL
P-R INTERVAL, ECG05: 138 MS
Q-T INTERVAL, ECG07: 452 MS
QRS DURATION, ECG06: 120 MS
QTC CALCULATION (BEZET), ECG08: 439 MS
VENTRICULAR RATE, ECG03: 57 BPM

## 2021-12-22 PROCEDURE — 74011250636 HC RX REV CODE- 250/636: Performed by: NURSE ANESTHETIST, CERTIFIED REGISTERED

## 2021-12-22 PROCEDURE — 76010000138 HC OR TIME 0.5 TO 1 HR: Performed by: INTERNAL MEDICINE

## 2021-12-22 PROCEDURE — 77030008684 HC TU ET CUF COVD -B: Performed by: NURSE ANESTHETIST, CERTIFIED REGISTERED

## 2021-12-22 PROCEDURE — 74011250637 HC RX REV CODE- 250/637: Performed by: SURGERY

## 2021-12-22 PROCEDURE — 77030010104 HC SEAL PRT ENDOSC BYRN -B: Performed by: INTERNAL MEDICINE

## 2021-12-22 PROCEDURE — 74011250637 HC RX REV CODE- 250/637: Performed by: INTERNAL MEDICINE

## 2021-12-22 PROCEDURE — 77030013079 HC BLNKT BAIR HGGR 3M -A: Performed by: NURSE ANESTHETIST, CERTIFIED REGISTERED

## 2021-12-22 PROCEDURE — G0378 HOSPITAL OBSERVATION PER HR: HCPCS

## 2021-12-22 PROCEDURE — 77030040361 HC SLV COMPR DVT MDII -B: Performed by: INTERNAL MEDICINE

## 2021-12-22 PROCEDURE — 76210000006 HC OR PH I REC 0.5 TO 1 HR: Performed by: INTERNAL MEDICINE

## 2021-12-22 PROCEDURE — 77030009038 HC CATH BILI STN RTVR BSC -C: Performed by: INTERNAL MEDICINE

## 2021-12-22 PROCEDURE — 74330 X-RAY BILE/PANC ENDOSCOPY: CPT

## 2021-12-22 PROCEDURE — 74011250636 HC RX REV CODE- 250/636: Performed by: NURSE PRACTITIONER

## 2021-12-22 PROCEDURE — 2709999900 HC NON-CHARGEABLE SUPPLY: Performed by: INTERNAL MEDICINE

## 2021-12-22 PROCEDURE — 74011250637 HC RX REV CODE- 250/637: Performed by: NURSE PRACTITIONER

## 2021-12-22 PROCEDURE — 74011250636 HC RX REV CODE- 250/636: Performed by: SURGERY

## 2021-12-22 PROCEDURE — 77030026438 HC STYL ET INTUB CARD -A: Performed by: NURSE ANESTHETIST, CERTIFIED REGISTERED

## 2021-12-22 PROCEDURE — 74011000250 HC RX REV CODE- 250: Performed by: NURSE ANESTHETIST, CERTIFIED REGISTERED

## 2021-12-22 PROCEDURE — 74011250636 HC RX REV CODE- 250/636: Performed by: ANESTHESIOLOGY

## 2021-12-22 PROCEDURE — 74011250636 HC RX REV CODE- 250/636: Performed by: INTERNAL MEDICINE

## 2021-12-22 PROCEDURE — 74011000636 HC RX REV CODE- 636: Performed by: INTERNAL MEDICINE

## 2021-12-22 PROCEDURE — 77030041276 HC SPHNTOM BILI BSC -F: Performed by: INTERNAL MEDICINE

## 2021-12-22 PROCEDURE — 76060000032 HC ANESTHESIA 0.5 TO 1 HR: Performed by: INTERNAL MEDICINE

## 2021-12-22 RX ORDER — SODIUM CHLORIDE 0.9 % (FLUSH) 0.9 %
5-40 SYRINGE (ML) INJECTION EVERY 8 HOURS
Status: DISCONTINUED | OUTPATIENT
Start: 2021-12-22 | End: 2021-12-22 | Stop reason: HOSPADM

## 2021-12-22 RX ORDER — SODIUM CHLORIDE 0.9 % (FLUSH) 0.9 %
5-40 SYRINGE (ML) INJECTION AS NEEDED
Status: DISCONTINUED | OUTPATIENT
Start: 2021-12-22 | End: 2021-12-23 | Stop reason: HOSPADM

## 2021-12-22 RX ORDER — EPINEPHRINE 0.1 MG/ML
INJECTION INTRACARDIAC; INTRAVENOUS
Status: DISPENSED
Start: 2021-12-22 | End: 2021-12-22

## 2021-12-22 RX ORDER — PROPOFOL 10 MG/ML
INJECTION, EMULSION INTRAVENOUS AS NEEDED
Status: DISCONTINUED | OUTPATIENT
Start: 2021-12-22 | End: 2021-12-22 | Stop reason: HOSPADM

## 2021-12-22 RX ORDER — ATROPINE SULFATE 0.1 MG/ML
0.5 INJECTION INTRAVENOUS
Status: DISCONTINUED | OUTPATIENT
Start: 2021-12-22 | End: 2021-12-22

## 2021-12-22 RX ORDER — ONDANSETRON 2 MG/ML
INJECTION INTRAMUSCULAR; INTRAVENOUS AS NEEDED
Status: DISCONTINUED | OUTPATIENT
Start: 2021-12-22 | End: 2021-12-22 | Stop reason: HOSPADM

## 2021-12-22 RX ORDER — SODIUM CHLORIDE 0.9 % (FLUSH) 0.9 %
5-40 SYRINGE (ML) INJECTION AS NEEDED
Status: DISCONTINUED | OUTPATIENT
Start: 2021-12-22 | End: 2021-12-22 | Stop reason: HOSPADM

## 2021-12-22 RX ORDER — NALOXONE HYDROCHLORIDE 0.4 MG/ML
0.4 INJECTION, SOLUTION INTRAMUSCULAR; INTRAVENOUS; SUBCUTANEOUS
Status: DISCONTINUED | OUTPATIENT
Start: 2021-12-22 | End: 2021-12-22

## 2021-12-22 RX ORDER — SODIUM CHLORIDE 0.9 % (FLUSH) 0.9 %
5-40 SYRINGE (ML) INJECTION EVERY 8 HOURS
Status: DISCONTINUED | OUTPATIENT
Start: 2021-12-22 | End: 2021-12-23 | Stop reason: HOSPADM

## 2021-12-22 RX ORDER — FENTANYL CITRATE 50 UG/ML
INJECTION, SOLUTION INTRAMUSCULAR; INTRAVENOUS AS NEEDED
Status: DISCONTINUED | OUTPATIENT
Start: 2021-12-22 | End: 2021-12-22 | Stop reason: HOSPADM

## 2021-12-22 RX ORDER — FENTANYL CITRATE 50 UG/ML
25 INJECTION, SOLUTION INTRAMUSCULAR; INTRAVENOUS
Status: DISCONTINUED | OUTPATIENT
Start: 2021-12-22 | End: 2021-12-22 | Stop reason: HOSPADM

## 2021-12-22 RX ORDER — SODIUM CHLORIDE, SODIUM LACTATE, POTASSIUM CHLORIDE, CALCIUM CHLORIDE 600; 310; 30; 20 MG/100ML; MG/100ML; MG/100ML; MG/100ML
75 INJECTION, SOLUTION INTRAVENOUS CONTINUOUS
Status: DISCONTINUED | OUTPATIENT
Start: 2021-12-22 | End: 2021-12-22 | Stop reason: HOSPADM

## 2021-12-22 RX ORDER — DEXAMETHASONE SODIUM PHOSPHATE 4 MG/ML
INJECTION, SOLUTION INTRA-ARTICULAR; INTRALESIONAL; INTRAMUSCULAR; INTRAVENOUS; SOFT TISSUE AS NEEDED
Status: DISCONTINUED | OUTPATIENT
Start: 2021-12-22 | End: 2021-12-22 | Stop reason: HOSPADM

## 2021-12-22 RX ORDER — ONDANSETRON 2 MG/ML
4 INJECTION INTRAMUSCULAR; INTRAVENOUS AS NEEDED
Status: DISCONTINUED | OUTPATIENT
Start: 2021-12-22 | End: 2021-12-22 | Stop reason: HOSPADM

## 2021-12-22 RX ORDER — MIDAZOLAM HYDROCHLORIDE 1 MG/ML
.25-5 INJECTION, SOLUTION INTRAMUSCULAR; INTRAVENOUS
Status: DISCONTINUED | OUTPATIENT
Start: 2021-12-22 | End: 2021-12-22

## 2021-12-22 RX ORDER — EPINEPHRINE 0.1 MG/ML
1 INJECTION INTRACARDIAC; INTRAVENOUS
Status: DISCONTINUED | OUTPATIENT
Start: 2021-12-22 | End: 2021-12-22

## 2021-12-22 RX ORDER — LIDOCAINE HYDROCHLORIDE 20 MG/ML
INJECTION, SOLUTION EPIDURAL; INFILTRATION; INTRACAUDAL; PERINEURAL AS NEEDED
Status: DISCONTINUED | OUTPATIENT
Start: 2021-12-22 | End: 2021-12-22 | Stop reason: HOSPADM

## 2021-12-22 RX ORDER — FLUMAZENIL 0.1 MG/ML
0.2 INJECTION INTRAVENOUS
Status: DISCONTINUED | OUTPATIENT
Start: 2021-12-22 | End: 2021-12-22

## 2021-12-22 RX ORDER — HYDRALAZINE HYDROCHLORIDE 20 MG/ML
10 INJECTION INTRAMUSCULAR; INTRAVENOUS
Status: DISCONTINUED | OUTPATIENT
Start: 2021-12-22 | End: 2021-12-23 | Stop reason: HOSPADM

## 2021-12-22 RX ORDER — DEXTROMETHORPHAN/PSEUDOEPHED 2.5-7.5/.8
1.2 DROPS ORAL
Status: DISCONTINUED | OUTPATIENT
Start: 2021-12-22 | End: 2021-12-22

## 2021-12-22 RX ORDER — ROCURONIUM BROMIDE 10 MG/ML
INJECTION, SOLUTION INTRAVENOUS AS NEEDED
Status: DISCONTINUED | OUTPATIENT
Start: 2021-12-22 | End: 2021-12-22 | Stop reason: HOSPADM

## 2021-12-22 RX ORDER — SODIUM CHLORIDE 9 MG/ML
75 INJECTION, SOLUTION INTRAVENOUS CONTINUOUS
Status: DISCONTINUED | OUTPATIENT
Start: 2021-12-22 | End: 2021-12-22

## 2021-12-22 RX ORDER — MIDAZOLAM HYDROCHLORIDE 1 MG/ML
INJECTION, SOLUTION INTRAMUSCULAR; INTRAVENOUS AS NEEDED
Status: DISCONTINUED | OUTPATIENT
Start: 2021-12-22 | End: 2021-12-22 | Stop reason: HOSPADM

## 2021-12-22 RX ORDER — FENTANYL CITRATE 50 UG/ML
25 INJECTION, SOLUTION INTRAMUSCULAR; INTRAVENOUS
Status: DISCONTINUED | OUTPATIENT
Start: 2021-12-22 | End: 2021-12-22

## 2021-12-22 RX ORDER — SUCCINYLCHOLINE CHLORIDE 20 MG/ML
INJECTION INTRAMUSCULAR; INTRAVENOUS AS NEEDED
Status: DISCONTINUED | OUTPATIENT
Start: 2021-12-22 | End: 2021-12-22 | Stop reason: HOSPADM

## 2021-12-22 RX ORDER — OXYCODONE HYDROCHLORIDE 5 MG/1
5-10 TABLET ORAL
Status: DISCONTINUED | OUTPATIENT
Start: 2021-12-22 | End: 2021-12-23 | Stop reason: HOSPADM

## 2021-12-22 RX ADMIN — SODIUM CHLORIDE, POTASSIUM CHLORIDE, SODIUM LACTATE AND CALCIUM CHLORIDE 25 ML/HR: 600; 310; 30; 20 INJECTION, SOLUTION INTRAVENOUS at 08:37

## 2021-12-22 RX ADMIN — OXYCODONE 5 MG: 5 TABLET ORAL at 12:20

## 2021-12-22 RX ADMIN — Medication 3 AMPULE: at 08:36

## 2021-12-22 RX ADMIN — Medication 10 ML: at 22:12

## 2021-12-22 RX ADMIN — OXYCODONE 5 MG: 5 TABLET ORAL at 17:08

## 2021-12-22 RX ADMIN — POTASSIUM CHLORIDE, DEXTROSE MONOHYDRATE AND SODIUM CHLORIDE 125 ML/HR: 150; 5; 450 INJECTION, SOLUTION INTRAVENOUS at 04:24

## 2021-12-22 RX ADMIN — POTASSIUM CHLORIDE, DEXTROSE MONOHYDRATE AND SODIUM CHLORIDE 75 ML/HR: 150; 5; 450 INJECTION, SOLUTION INTRAVENOUS at 23:18

## 2021-12-22 RX ADMIN — Medication 1 AMPULE: at 22:11

## 2021-12-22 RX ADMIN — Medication 1 AMPULE: at 11:38

## 2021-12-22 RX ADMIN — SUCCINYLCHOLINE CHLORIDE 180 MG: 20 INJECTION, SOLUTION INTRAMUSCULAR; INTRAVENOUS at 09:16

## 2021-12-22 RX ADMIN — Medication 10 ML: at 14:33

## 2021-12-22 RX ADMIN — MIDAZOLAM HYDROCHLORIDE 2 MG: 1 INJECTION, SOLUTION INTRAMUSCULAR; INTRAVENOUS at 09:02

## 2021-12-22 RX ADMIN — FENTANYL CITRATE 100 MCG: 50 INJECTION, SOLUTION INTRAMUSCULAR; INTRAVENOUS at 09:16

## 2021-12-22 RX ADMIN — POTASSIUM CHLORIDE, DEXTROSE MONOHYDRATE AND SODIUM CHLORIDE 125 ML/HR: 150; 5; 450 INJECTION, SOLUTION INTRAVENOUS at 11:26

## 2021-12-22 RX ADMIN — DEXAMETHASONE SODIUM PHOSPHATE 10 MG: 4 INJECTION, SOLUTION INTRAMUSCULAR; INTRAVENOUS at 09:22

## 2021-12-22 RX ADMIN — Medication 10 ML: at 05:05

## 2021-12-22 RX ADMIN — HYDROMORPHONE HYDROCHLORIDE 0.5 MG: 1 INJECTION, SOLUTION INTRAMUSCULAR; INTRAVENOUS; SUBCUTANEOUS at 08:34

## 2021-12-22 RX ADMIN — LIDOCAINE HYDROCHLORIDE 100 MG: 20 INJECTION, SOLUTION INTRAVENOUS at 09:16

## 2021-12-22 RX ADMIN — OXYCODONE 10 MG: 5 TABLET ORAL at 22:11

## 2021-12-22 RX ADMIN — PROPOFOL 170 MG: 10 INJECTION, EMULSION INTRAVENOUS at 09:16

## 2021-12-22 RX ADMIN — LEVOFLOXACIN 750 MG: 5 INJECTION, SOLUTION INTRAVENOUS at 02:28

## 2021-12-22 RX ADMIN — ONDANSETRON HYDROCHLORIDE 4 MG: 2 INJECTION, SOLUTION INTRAMUSCULAR; INTRAVENOUS at 09:50

## 2021-12-22 RX ADMIN — ROCURONIUM BROMIDE 5 MG: 10 INJECTION INTRAVENOUS at 09:16

## 2021-12-22 NOTE — PROGRESS NOTES
SURGERY PROGRESS NOTE      Admit Date: 2021    POD Day of Surgery    Procedure: Procedure(s):  ENDOSCOPIC RETROGRADE CHOLANGIOPANCREATOGRAPHY WITH SPHINCTEROTOMY AND BALLOON SWEEP      Subjective:     Patient complains of bloating. Denies nausea. Pain controlled. Objective:     Visit Vitals  BP (!) 150/85   Pulse 66   Temp 99.2 °F (37.3 °C)   Resp 17   Ht 5' 9\" (1.753 m)   Wt 85.3 kg (188 lb)   SpO2 96%   BMI 27.76 kg/m²        Temp (24hrs), Av.8 °F (37.1 °C), Min:97.5 °F (36.4 °C), Max:99.3 °F (37.4 °C)      701 - 1900  In: 700 [I.V.:700]  Out: -   1901 -  0700  In: 400 [I.V.:400]  Out: 2360 [Urine:2350]    Physical Exam:    General:  alert, cooperative, no distress, appears stated age   Abdomen: soft, bowel sounds active, non-tender   Incision:   healing well, no drainage, no erythema, no hernia, no seroma, no swelling, no dehiscence, incision well approximated           Lab Results   Component Value Date/Time    WBC 13.6 (H) 2021 12:20 AM    HGB 13.6 2021 12:20 AM    HCT 42.6 2021 12:20 AM    PLATELET 559  12:20 AM    MCV 91.2 2021 12:20 AM     Lab Results   Component Value Date/Time    ALT (SGPT) 190 (H) 2021 12:20 AM    Alk. phosphatase 104 2021 12:20 AM    Bilirubin, total 1.5 (H) 2021 12:20 AM    Albumin 4.0 2021 12:20 AM    Protein, total 7.1 2021 12:20 AM    PLATELET 649  12:20 AM       ERCP - No CBD stones. sphincter stenosis. Sphincterotomy done      Assessment:     Active Problems:    Acute cholecystitis (2021)    stable.   Will observe overnight with plans D/C in am    Plan:     Clear liquids  Am labs

## 2021-12-22 NOTE — PERIOP NOTES
Handoff Report from Operating Room to PACU    Report received from Nicol Harris RN and ABIODUN Leon CRNA regarding Drew Briscoe. Surgeon(s):  Morena Ortiz MD  And Procedure(s) (LRB):  ENDOSCOPIC RETROGRADE CHOLANGIOPANCREATOGRAPHY WITH SPHINCTEROTOMY AND BALLOON SWEEP (N/A)  confirmed   with allergies discussed. Anesthesia type, drugs, patient history, complications, estimated blood loss, vital signs, intake and output, and last pain medication, lines and temperature were reviewed.

## 2021-12-22 NOTE — PROGRESS NOTES
Transition of Care Plan:  RUR: n/a obs status   Disposition: home with family   Follow up appointments: PCP, speciality   DME needed: none anticipated   Transportation at Discharge: wife   Malgorzata Brito or means to access home: wife to provide      IM Medicare Letter: n/a commercial insurance   Is patient a BCPI-A Bundle:  no   If yes, was Bundle Letter given?:    Is patient a  and connected with the [facility]?no  If yes, was [employer] transfer form completed and [facility] notified? Caregiver Contact: wife Kareem Adam [phone]  Discharge Caregiver contacted prior to discharge? yes             Reason for Admission:  Acute cholecystitis                    RUR Score: n/a obs status                     Plan for utilizing home health: per recommendation         PCP: First and Last name:  Neva Salguero MD   [facility] of Practice[facility]    Are you a current patient: Yes/No: yes   Approximate date of last visit: over a year    Can you participate in a virtual visit with your PCP: yes                    Current Advanced Directive/Advance Care Plan: Caroline Arshad (ACP) Conversation      Date of Conversation: 12/22/21  Conducted with: Patient with Decision Making Capacity    Content/Action Overview:   DECLINED ACP conversation - will revisit periodically     Healthcare Decision Maker:   Click here to complete [address] including selection of the Healthcare Decision Maker Relationship (ie \"Primary\")                        Transition of Care Plan:                      CM completed initial assessment with patient who confirmed demographics, insurance, and emergency contact on file. Pt uses [facility]. Pt, wife, and 2 daughters (under the age of 25) live in a 2 story home with 4 toney. Pt has no DME. At baseline pt is independent with ADLs and driving. Pt has no hx of HH, SNF, or IPR. Pt's plan is to d/c home with family.  Pt's wife to provide transportation at d/c.     Care Management Interventions  PCP Verified by CM: Yes  Mode of Transport at Discharge:  Other (see comment) (wife)  Transition of Care Consult (CM Consult): Discharge Planning  Discharge Durable Medical Equipment: No  Physical Therapy Consult: No  Occupational Therapy Consult: No  Speech Therapy Consult: No  Support Systems: Spouse/Significant Other  Confirm Follow Up Transport: Family  Discharge Location  Discharge Placement: Home with family assistance    Ashok, [address] [facility], [address]

## 2021-12-22 NOTE — CONSULTS
GI Consultation Note Margarita Joshi    NAME: Blas Good : 1980 MRN: [id number]   ATTG: Ester Rachel MD   Primary GI: Faisal Scherer MD   PCP: Shea Gregorio MD  Date/Time:  2021 7:54 AM  Subjective:   REASON FOR CONSULT:      Louis Trejo is a 37 y.o.  male who I was asked to see for abnormal cholangiogram with no emptying into the duodenum. He underwent lap shaun yesterday PM with noted 6mo hx intermittent RUQ, acutely worsening, which on evaluation was wound to have elevated liver enzyme testing and US with cholelithiasis and diffuse gallbladder wall thickening. No biliary duct dilatation was noted. IOC demonstrated common bile duct demonstrated no definite filling defect but no contrast could be demonstrated emptying into the duodenum. He notes tenderness this AM about the umbilicus. He has not had a BM or passed flatus. He denies F/C. He states his mother had pancreatitis about 10yrs ago. He had undercone evaluation 2-3 wks ago of this pain by Geoff Garay at which time \"irritation\" was described in the stomach and he was prescribed some acid suppressive therapy. History reviewed. No pertinent past medical history. Past Surgical History:   Procedure Laterality Date    HX HEENT      tonsils    HX HEENT      wisdom teeth    HX OTHER SURGICAL  2020    Vasectomy     Social History     Tobacco Use    Smoking status: Never Smoker    Smokeless tobacco: Never Used   Substance Use Topics    Alcohol use: Yes     Comment: 8-10 drinks per week      History reviewed. No pertinent family history. Allergies   Allergen Reactions    Pcn [Penicillins] Rash      Home Medications:  Prior to Admission Medications   Prescriptions Last Dose Informant Patient Reported? Taking? cetirizine (ZYRTEC) 10 mg tablet 2021  Yes No   Sig: Take  by mouth. sertraline (ZOLOFT) 100 mg tablet 2021  Yes No   Sig: Take  by mouth daily.       Facility-Administered Medications: None Hospital medications:  Current Facility-Administered Medications   Medication Dose Route Frequency    iothalamate meglumine (CONRAY 60) 60 % contrast solution 50 mL  50 mL InterCATHeter RAD ONCE    acetaminophen (TYLENOL) tablet 650 mg  650 mg Oral Q6H PRN    dextrose 5% - 0.45% NaCl with KCl 20 mEq/L infusion  125 mL/hr IntraVENous CONTINUOUS    HYDROmorphone (DILAUDID) injection 0.5 mg  0.5 mg IntraVENous Q6H PRN    LORazepam (ATIVAN) injection 1 mg  1 mg IntraVENous Q6H PRN    ondansetron (ZOFRAN) injection 4 mg  4 mg IntraVENous Q6H PRN    levoFLOXacin (LEVAQUIN) 750 mg in D5W IVPB  750 mg IntraVENous Q24H    lactated Ringers infusion  25 mL/hr IntraVENous CONTINUOUS    sodium chloride (NS) flush 5-40 mL  5-40 mL IntraVENous Q8H    sodium chloride (NS) flush 5-40 mL  5-40 mL IntraVENous PRN    lidocaine (PF) (XYLOCAINE) 10 mg/mL (1 %) injection 0.1 mL  0.1 mL SubCUTAneous PRN    oxyCODONE IR (ROXICODONE) tablet 5 mg  5 mg Oral Q4H PRN     REVIEW OF SYSTEMS:     [x]    Total of 11 systems reviewed as follows:  Const:  negative fever, negative chills, negative weight loss  Eyes:   negative diplopia or visual changes, negative eye pain  ENT:   negative coryza, negative sore throat  Resp:   negative cough, hemoptysis, dyspnea  Cards:  negative for chest pain, palpitations, lower extremity edema  :  negative for frequency, dysuria and hematuria  Skin:   negative for rash and pruritus  Heme:  negative for easy bruising and gum/nose bleeding  MS:  negative for myalgias, arthralgias, back pain and muscle weakness  Neurolo:  negative for headaches, dizziness, vertigo, memory problems   Psych:  negative for feelings of anxiety, depression     Pertinent Positives include :    Objective:   VITALS:    Visit Vitals  /78 (BP Patient Position: At rest)   Pulse 81   Temp 98.6 °F (37 °C)   Resp 18   Ht 5' 9\" (1.753 m)   Wt 85.3 kg (188 lb)   SpO2 96%   BMI 27.76 kg/m²     Temp (24hrs), Av.6 °F (37 °C), Min:97.5 °F (36.4 °C), Max:99.1 °F (37.3 °C)    PHYSICAL EXAM:   General:    Alert, cooperative, no distress, appears stated age. Head:   Normocephalic, without obvious abnormality, atraumatic. Eyes:   Conjunctivae clear, anicteric sclerae. Pupils are equal  Nose:  Nares normal. No drainage or sinus tenderness. Throat:    Lips, mucosa, and tongue normal.  No Thrush  Neck:  Supple, symmetrical,  no adenopathy, thyroid: non tender  Back:    Symmetric,  No CVA tenderness. Lungs:   CTA bilaterally. No wheezing/rhonchi/rales. Chest wall:  No tenderness or deformity. No Accessory muscle use. Heart:   Regular rate and rhythm,  no murmur, rub or gallop. Abdomen:   S, T at incision periumbilical, ND, decreased BS, no G/R   Extremities: Atraumatic, No cyanosis. No edema. No clubbing  Skin:     Texture, turgor normal. No rashes/lesions/jaundice  Lymph: Cervical, supraclavicular normal.  Psych:  Good insight. Not depressed. Not anxious or agitated. Neurologic: EOMs intact. No facial asymmetry. No aphasia/slurred speech. Normal strength, A/O X 3. LAB DATA REVIEWED:    Recent Results (from the past 48 hour(s))   CBC WITH AUTOMATED DIFF    Collection Time: 12/21/21 12:20 AM   Result Value Ref Range    WBC 13.6 (H) 4.1 - 11.1 K/uL    RBC 4.67 4.10 - 5.70 M/uL    HGB 13.6 12.1 - 17.0 g/dL    HCT 42.6 36.6 - 50.3 %    MCV 91.2 80.0 - 99.0 FL    MCH 29.1 26.0 - 34.0 PG    MCHC 31.9 30.0 - 36.5 g/dL    RDW 14.9 (H) 11.5 - 14.5 %    PLATELET 458 135 - 536 K/uL    MPV 9.7 8.9 - 12.9 FL    NRBC 0.0 0  WBC    ABSOLUTE NRBC 0.00 0.00 - 0.01 K/uL    NEUTROPHILS 77 (H) 32 - 75 %    LYMPHOCYTES 14 12 - 49 %    MONOCYTES 8 5 - 13 %    EOSINOPHILS 1 0 - 7 %    BASOPHILS 0 0 - 1 %    IMMATURE GRANULOCYTES 0 0.0 - 0.5 %    ABS. NEUTROPHILS 10.4 (H) 1.8 - 8.0 K/UL    ABS. LYMPHOCYTES 1.9 0.8 - 3.5 K/UL    ABS. MONOCYTES 1.1 (H) 0.0 - 1.0 K/UL    ABS. EOSINOPHILS 0.2 0.0 - 0.4 K/UL    ABS.  BASOPHILS 0.0 0.0 - 0.1 K/UL ABS. IMM. GRANS. 0.1 (H) 0.00 - 0.04 K/UL    DF AUTOMATED     METABOLIC PANEL, COMPREHENSIVE    Collection Time: 12/21/21 12:20 AM   Result Value Ref Range    Sodium 140 136 - 145 mmol/L    Potassium 3.5 3.5 - 5.1 mmol/L    Chloride 105 97 - 108 mmol/L    CO2 28 21 - 32 mmol/L    Anion gap 7 5 - 15 mmol/L    Glucose 185 (H) 65 - 100 mg/dL    BUN 14 6 - 20 MG/DL    Creatinine 1.13 0.70 - 1.30 MG/DL    BUN/Creatinine ratio 12 12 - 20      GFR est AA >60 >60 ml/min/1.73m2    GFR est non-AA >60 >60 ml/min/1.73m2    Calcium 9.5 8.5 - 10.1 MG/DL    Bilirubin, total 1.5 (H) 0.2 - 1.0 MG/DL    ALT (SGPT) 190 (H) 12 - 78 U/L    AST (SGOT) 277 (H) 15 - 37 U/L    Alk.  phosphatase 104 45 - 117 U/L    Protein, total 7.1 6.4 - 8.2 g/dL    Albumin 4.0 3.5 - 5.0 g/dL    Globulin 3.1 2.0 - 4.0 g/dL    A-G Ratio 1.3 1.1 - 2.2     LIPASE    Collection Time: 12/21/21 12:20 AM   Result Value Ref Range    Lipase 160 73 - 393 U/L   TROPONIN-HIGH SENSITIVITY    Collection Time: 12/21/21 12:20 AM   Result Value Ref Range    Troponin-High Sensitivity 4 0 - 76 ng/L   EKG, 12 LEAD, INITIAL    Collection Time: 12/21/21 12:34 AM   Result Value Ref Range    Ventricular Rate 57 BPM    Atrial Rate 57 BPM    P-R Interval 138 ms    QRS Duration 120 ms    Q-T Interval 452 ms    QTC Calculation (Bezet) 439 ms    Calculated P Axis -164 degrees    Calculated R Axis -177 degrees    Calculated T Axis 151 degrees    Diagnosis       ** Suspect arm lead reversal, interpretation assumes no reversal  Unusual P axis, possible ectopic atrial bradycardia  Right superior axis deviation  Nonspecific intraventricular conduction delay  Nonspecific ST and T wave abnormality  No previous ECGs available     URINALYSIS W/ RFLX MICROSCOPIC    Collection Time: 12/21/21  3:45 AM   Result Value Ref Range    Color YELLOW/STRAW      Appearance TURBID (A) CLEAR      Specific gravity 1.024 1.003 - 1.030      pH (UA) 8.0 5.0 - 8.0      Protein 30 (A) NEG mg/dL    Glucose 250 (A) NEG mg/dL    Ketone TRACE (A) NEG mg/dL    Bilirubin Negative NEG      Blood Negative NEG      Urobilinogen 1.0 0.2 - 1.0 EU/dL    Nitrites Negative NEG      Leukocyte Esterase Negative NEG      WBC 0-4 0 - 4 /hpf    RBC 0-5 0 - 5 /hpf    Epithelial cells FEW FEW /lpf    Bacteria Negative NEG /hpf    Hyaline cast 0-2 0 - 5 /lpf   COVID-19 RAPID TEST    Collection Time: 12/21/21  8:01 AM   Result Value Ref Range    Specimen source Nasopharyngeal      COVID-19 rapid test Not detected NOTD       IMAGING RESULTS:   [x]      I have personally reviewed the actual   []    CXR  [x]    IOC  [x]     US    Recommendations/Plan:      Active Problems:    Acute cholecystitis (12/21/2021)       ___________________________________________________  RECOMMENDATIONS:    45yo M with elevated liver enzymes and abnl biliary radiograph showing no definite CBD FD, but without efflux of contrast into the duodenum. This might represent distal CBD stone, ampullary stenosis or spasm. A mass lesion is felt less likely as no long-standin dilatation is noted. Plan:  1) Maintain as NPO  2) ERCP under GA today.   Risks and benefits are d/w pt by me and consent was obtained  3) Continue Levaquin for now    Discussed Code Status:    [x]    Full Code      []    DNR    ___________________________________________________  Care Plan discussed with:    [x]    Patient   []    Family   [x]    Nursing   []    Attending  Total Time :  50   minutes   ___________________________________________________  GI: Kristofer Pacheco MD

## 2021-12-22 NOTE — PROCEDURES
NAME:  Silviano Smith   :   1980   MRN:   [id number]     JESSICA ASHRAF [facility]  Date/Time:  2021 10:17 AM    Procedure Type:   ERCP with biliary sphincterotomy, balloon sweep of bile duct, injection of epinephrine at sphincterotomy     Indications: abnormal cholangiogram  Pre-operative Diagnosis: see indication above  Post-operative Diagnosis:  See findings below    Primary Gastroenterologist: Frandy Grajeda MD  : Iza Mckeon MD  Referring Provider: Marvin Hartman MD, Phuc Allen MD    Exam:  Airway: clear, no airway problems anticipated  Heart: RRR, without gallops or rubs  Lungs: clear bilaterally without wheezes, crackles, or rhonchi  Abdomen: soft, nontender, nondistended, bowel sounds present  Mental Status: awake, alert and oriented to person, place and time    Sedation:  General anesthesia  Procedure Details:  After informed consent was obtained with all risks and benefits of procedure explained, the patient was taken to the fluoroscopy suite and placed in the supine position. Upon sequential sedation as per above, the Olympus duodenoscope [id number]   was inserted via the mouthpeice and carefully advanced to the second portion of the duodenum. The quality of visualization was adequate. The duodenoscope was withdrawn into a short position. Findings:   Endoscopic: -normal esophagus, stomach, and duodenum  Ampulla:edematous, inflamed appearing  Cholangiogram: -no filling defect or significant dilatation; distal debris suggested in setting of apparent short segmant ampullary stenosis  Pancreatogram:not performed    Specimen Removed:  None  Complications: None. EBL:  None. Interventions:    Pancreatic: none  Biliary: Initial cannulation attempts using wire guided technique in short position resulted in access to the pancreatic duct.   A short wire was left in place in the pancreatic duct and the sphinctertome was repositioned and used to access the common bile duct with access confirmed by bile aspirate and flouroscopy. The wire in the pancreatic duct is removed. Contrast injected into the common bile duct does not demonstrate filling defect or significant dilatation with CBD felt to measure 6-7mm. There is a short segement od narrowed duct suggestive of ampullary stenosis with possible debris. A sphincterotomy is completed to allow for contrast and bile efflux with minimal bleeding noted. An exchange for 9-12mm biliary balloon completed over wire with three pull throughs of fully inflated balloon to 12mm completed with no ejection of stone or debris noted. the balloon passes throught the sphincterotomy with minimal resitance. As there is persistent minimal bleeding, two separate 1cc injections of 1:[id number] epi mix completed on both lateral sides of  sphincterotmy with immediate blanching and cessation of bleeding. There is no residual contrast noted in the bile duct or evidence of leak. The entire endoscopic apparatus is removed and the patient is noted to tolerate the procedure well. Impression:    Initial cannulation attempts using wire guided technique in short position resulted in access to the pancreatic duct. A short wire was left in place in the pancreatic duct and the sphinctertome was repositioned and used to access the common bile duct with access confirmed by bile aspirate and flouroscopy. The wire in the pancreatic duct is removed. Contrast injected into the common bile duct does not demonstrate filling defect or significant dilatation with CBD felt to measure 6-7mm. There is a short segement od narrowed duct suggestive of ampullary stenosis with possible debris. A sphincterotomy is completed to allow for contrast and bile efflux with minimal bleeding noted. An exchange for 9-12mm biliary balloon completed over wire with three pull throughs of fully inflated balloon to 12mm completed with no ejection of stone or debris noted.   the balloon passes throught the sphincterotomy with minimal resitance. As there is persistent minimal bleeding, two separate 1cc injections of 1:02794 epi mix completed on both lateral sides of  sphincterotmy with immediate blanching and cessation of bleeding. There is no residual contrast noted in the bile duct or evidence of leak. The entire endoscopic apparatus is removed and the patient is noted to tolerate the procedure well. Recommendations:  -Keep NPO x 2hrs, then allow CLD today and GI Lite diet tomorrow  -Resume normal medication(s).   -NO aspirin/NSAIDS for 10 days        Discharge Disposition:  To room following recovery in PACU    Romain Rodas MD

## 2021-12-22 NOTE — PERIOP NOTES
TRANSFER - OUT REPORT:    Verbal report given to [facility] RN(name) on Jenna Nip being transferred to [facility](unit) for routine post - op       Report consisted of patient's Situation, Background, Assessment and   Recommendations(SBAR). Information from the following report(s) SBAR, OR Summary, Intake/Output, MAR and Cardiac Rhythm NSR was reviewed with the receiving nurse. Opportunity for questions and clarification was provided.       Patient transported with:   M-DAQ

## 2021-12-22 NOTE — ANESTHESIA PREPROCEDURE EVALUATION
Relevant Problems   No relevant active problems       Anesthetic History   No history of anesthetic complications            Review of Systems / Medical History  Patient summary reviewed, nursing notes reviewed and pertinent labs reviewed    Pulmonary  Within defined limits                 Neuro/Psych   Within defined limits           Cardiovascular  Within defined limits                Exercise tolerance: >4 METS     GI/Hepatic/Renal  Within defined limits              Endo/Other  Within defined limits           Other Findings              Physical Exam    Airway  Mallampati: II  TM Distance: 4 - 6 cm  Neck ROM: normal range of motion   Mouth opening: Normal     Cardiovascular  Regular rate and rhythm,  S1 and S2 normal,  no murmur, click, rub, or gallop             Dental  No notable dental hx       Pulmonary  Breath sounds clear to auscultation               Abdominal  GI exam deferred       Other Findings            Anesthetic Plan    ASA: 1  Anesthesia type: general    Monitoring Plan: BIS        Anesthetic plan and risks discussed with: Patient

## 2021-12-22 NOTE — PROGRESS NOTES
End of Shift Note    Bedside shift change report given to [address] (oncoming nurse) by Zoey Auguste RN (offgoing nurse). Report included the following information SBAR, Kardex, Intake/Output and MAR    Shift worked:  7P-7A     Shift summary and any significant changes:          Concerns for physician to address:       Zone phone for oncoming shift:   [phone]       Activity:  Activity Level: Up with Assistance  Number times ambulated in hallways past shift: 0  Number of times OOB to chair past shift: 0    Cardiac:   Cardiac Monitoring: No      Cardiac Rhythm: Sinus Rhythm    Access:   Current line(s): PIV     Genitourinary:   Urinary status: voiding    Respiratory:   O2 Device: None (Room air)  Chronic home O2 use?: NO  Incentive spirometer at bedside: YES     GI:  Last Bowel Movement Date: 12/20/21  Current diet:  DIET NPO  Passing flatus: YES  Tolerating current diet: YES       Pain Management:   Patient states pain is manageable on current regimen: YES    Skin:  Gab Score: 22  Interventions: float heels and increase time out of bed    Patient Safety:  Fall Score:  Total Score: 3  Interventions: assistive device (walker, cane, etc), gripper socks, pt to call before getting OOB and stay with me (per policy)  High Fall Risk: Yes    Length of Stay:  Expected LOS: - - -  Actual LOS: 0      Diogo Jimenez RN

## 2021-12-22 NOTE — PROGRESS NOTES
Problem: Falls - Risk of  Goal: *Absence of Falls  Description: Document Jaxson Rangel Fall Risk and appropriate interventions in the flowsheet.   Outcome: Progressing Towards Goal  Note: Fall Risk Interventions:  Mobility Interventions: Communicate number of staff needed for ambulation/transfer    Mentation Interventions: Adequate sleep, hydration, pain control    Medication Interventions: Patient to call before getting OOB    Elimination Interventions: Call light in reach              Problem: Patient Education: Go to Patient Education Activity  Goal: Patient/Family Education  Outcome: Progressing Towards Goal     Problem: Pain  Goal: *Control of Pain  Outcome: Progressing Towards Goal     Problem: Patient Education: Go to Patient Education Activity  Goal: Patient/Family Education  Outcome: Progressing Towards Goal

## 2021-12-22 NOTE — PROGRESS NOTES
TRANSFER - IN REPORT:    Verbal report received from JERI Lind (name) on [place]  being received from [phone] (unit) for ordered procedure      Report consisted of patients Situation, Background, Assessment and   Recommendations(SBAR). Information from the following report(s) SBAR, MAR and Recent Results was reviewed with the receiving nurse. Opportunity for questions and clarification was provided. Assessment completed upon patients arrival to unit and care assumed.

## 2021-12-22 NOTE — ANESTHESIA POSTPROCEDURE EVALUATION
Procedure(s):  ENDOSCOPIC RETROGRADE CHOLANGIOPANCREATOGRAPHY WITH SPHINCTEROTOMY AND BALLOON SWEEP. general    Anesthesia Post Evaluation      Multimodal analgesia: multimodal analgesia used between 6 hours prior to anesthesia start to PACU discharge  Patient location during evaluation: bedside  Patient participation: complete - patient participated  Level of consciousness: awake  Pain management: adequate  Airway patency: patent  Anesthetic complications: no  Cardiovascular status: acceptable  Respiratory status: acceptable  Hydration status: acceptable  Post anesthesia nausea and vomiting:  controlled  Final Post Anesthesia Temperature Assessment:  Normothermia (36.0-37.5 degrees C)      INITIAL Post-op Vital signs:   Vitals Value Taken Time   /91 12/22/21 1100   Temp 37.3 °C (99.2 °F) 12/22/21 1007   Pulse 67 12/22/21 1109   Resp 18 12/22/21 1109   SpO2 95 % 12/22/21 1109   Vitals shown include unvalidated device data.

## 2021-12-23 VITALS
BODY MASS INDEX: 27.85 KG/M2 | OXYGEN SATURATION: 98 % | HEART RATE: 81 BPM | DIASTOLIC BLOOD PRESSURE: 86 MMHG | SYSTOLIC BLOOD PRESSURE: 144 MMHG | HEIGHT: 69 IN | RESPIRATION RATE: 18 BRPM | TEMPERATURE: 98.6 F | WEIGHT: 188 LBS

## 2021-12-23 PROBLEM — K80.50 CHOLEDOCHOLITHIASIS: Status: ACTIVE | Noted: 2021-12-23

## 2021-12-23 PROBLEM — K83.1 AMPULLARY STENOSIS: Status: ACTIVE | Noted: 2021-12-23

## 2021-12-23 LAB
ALBUMIN SERPL-MCNC: 3.3 G/DL (ref 3.5–5)
ALBUMIN/GLOB SERPL: 1 {RATIO} (ref 1.1–2.2)
ALP SERPL-CCNC: 172 U/L (ref 45–117)
ALT SERPL-CCNC: 1306 U/L (ref 12–78)
ANION GAP SERPL CALC-SCNC: 5 MMOL/L (ref 5–15)
AST SERPL-CCNC: 534 U/L (ref 15–37)
BILIRUB SERPL-MCNC: 2.1 MG/DL (ref 0.2–1)
BUN SERPL-MCNC: 5 MG/DL (ref 6–20)
BUN/CREAT SERPL: 5 (ref 12–20)
CALCIUM SERPL-MCNC: 8.8 MG/DL (ref 8.5–10.1)
CHLORIDE SERPL-SCNC: 108 MMOL/L (ref 97–108)
CO2 SERPL-SCNC: 27 MMOL/L (ref 21–32)
CREAT SERPL-MCNC: 0.93 MG/DL (ref 0.7–1.3)
ERYTHROCYTE [DISTWIDTH] IN BLOOD BY AUTOMATED COUNT: 15.2 % (ref 11.5–14.5)
GLOBULIN SER CALC-MCNC: 3.3 G/DL (ref 2–4)
GLUCOSE SERPL-MCNC: 103 MG/DL (ref 65–100)
HCT VFR BLD AUTO: 43.2 % (ref 36.6–50.3)
HGB BLD-MCNC: 13.9 G/DL (ref 12.1–17)
LIPASE SERPL-CCNC: >3000 U/L (ref 73–393)
MCH RBC QN AUTO: 29.6 PG (ref 26–34)
MCHC RBC AUTO-ENTMCNC: 32.2 G/DL (ref 30–36.5)
MCV RBC AUTO: 91.9 FL (ref 80–99)
NRBC # BLD: 0 K/UL (ref 0–0.01)
NRBC BLD-RTO: 0 PER 100 WBC
PLATELET # BLD AUTO: 161 K/UL (ref 150–400)
PMV BLD AUTO: 9 FL (ref 8.9–12.9)
POTASSIUM SERPL-SCNC: 3.5 MMOL/L (ref 3.5–5.1)
PROT SERPL-MCNC: 6.6 G/DL (ref 6.4–8.2)
RBC # BLD AUTO: 4.7 M/UL (ref 4.1–5.7)
SODIUM SERPL-SCNC: 140 MMOL/L (ref 136–145)
WBC # BLD AUTO: 8.9 K/UL (ref 4.1–11.1)

## 2021-12-23 PROCEDURE — 80053 COMPREHEN METABOLIC PANEL: CPT

## 2021-12-23 PROCEDURE — G0378 HOSPITAL OBSERVATION PER HR: HCPCS

## 2021-12-23 PROCEDURE — 74011250636 HC RX REV CODE- 250/636: Performed by: INTERNAL MEDICINE

## 2021-12-23 PROCEDURE — 74011250637 HC RX REV CODE- 250/637: Performed by: NURSE PRACTITIONER

## 2021-12-23 PROCEDURE — 74011250637 HC RX REV CODE- 250/637: Performed by: INTERNAL MEDICINE

## 2021-12-23 PROCEDURE — 36415 COLL VENOUS BLD VENIPUNCTURE: CPT

## 2021-12-23 PROCEDURE — 85027 COMPLETE CBC AUTOMATED: CPT

## 2021-12-23 PROCEDURE — 83690 ASSAY OF LIPASE: CPT

## 2021-12-23 RX ORDER — LEVOFLOXACIN 750 MG/1
750 TABLET ORAL DAILY
Qty: 7 TABLET | Refills: 0 | Status: SHIPPED | OUTPATIENT
Start: 2021-12-23 | End: 2021-12-30

## 2021-12-23 RX ORDER — OXYCODONE HYDROCHLORIDE 5 MG/1
5 TABLET ORAL
Qty: 10 TABLET | Refills: 0 | Status: SHIPPED | OUTPATIENT
Start: 2021-12-23 | End: 2021-12-26

## 2021-12-23 RX ADMIN — OXYCODONE 10 MG: 5 TABLET ORAL at 04:16

## 2021-12-23 RX ADMIN — Medication 1 AMPULE: at 08:21

## 2021-12-23 RX ADMIN — LEVOFLOXACIN 750 MG: 5 INJECTION, SOLUTION INTRAVENOUS at 04:06

## 2021-12-23 RX ADMIN — Medication 10 ML: at 06:47

## 2021-12-23 NOTE — DISCHARGE SUMMARY
Physician Discharge Summary     Patient ID:  Alexandria Haywood  [id number]  19 y.o.  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 , which added 2-3 days to the patient's length of stay. Patient had intraoperative cholangiogram showing no flow into the duodenum, GI Dr. Delaney Brown was consulted and performed ERCP with findings of ampullary stenosis/stricture which underwent sphincterotomy and sweeping of the common bile duct revealing some mild sludge or other previous changes. Of note, patient's bilirubin mildly elevated on admission but patient's wife noted yesterday December 22, 2021 patient was more jaundiced but labs were not drawn that day, patient is 1 days post ERCP and ampulla sphincterotomy and bilirubin down to 2 but do not know what it was yesterday patient and wife both say he is less jaundiced today his urine is lightening up less dark clinically improved even though his bilirubin is still elevated today AST and ALT we do not know if this is on the trend down and reviewed with Dr. Adeola Oconnor who based on patient's clinical course, and I concur, patient okay for discharge with follow-up labs in a few days which have reviewed with patient and wife we discussed keeping him in the hospital until tomorrow but patient concurs he would rather be discharged today since clinically much improved.   Clinically eating well ambulatory and feels well. White blood cell count normal hemoglobin near normal, lipase elevated consistent with post ERCP pancreatitis patient and wife aware, GI aware, low-fat diet for a week, follow-up sooner if symptoms worsen given abdominal exam and clinical course improved patient and other physicians agree discharge    Review of Systems   Gastrointestinal:        Minimal abdominal pain   All other systems reviewed and are negative. Physical Exam  Vitals and nursing note reviewed. Exam conducted with a chaperone present (Wife at bedside). Constitutional:       General: He is not in acute distress. Appearance: Normal appearance. He is not ill-appearing. Cardiovascular:      Rate and Rhythm: Normal rate. Abdominal:      General: Abdomen is flat. Palpations: Abdomen is soft. Tenderness: There is no abdominal tenderness. Neurological:      General: No focal deficit present. Mental Status: He is alert and oriented to person, place, and time. Psychiatric:         Mood and Affect: Mood normal.         Behavior: Behavior normal.         Thought Content: Thought content normal.         Judgment: Judgment normal.                Consults: Gastroenterology Dr Carson Kay     Disposition: home    Patient Instructions:   Current Discharge Medication List      START taking these medications    Details   oxyCODONE IR (ROXICODONE) 5 mg immediate release tablet Take 1 Tablet by mouth every six (6) hours as needed for Pain for up to 3 days. Max Daily Amount: 20 mg.  Qty: 10 Tablet, Refills: 0    Associated Diagnoses: Acute cholecystitis      levoFLOXacin (LEVAQUIN) 750 mg tablet Take 1 Tablet by mouth daily for 7 days. Qty: 7 Tablet, Refills: 0         CONTINUE these medications which have NOT CHANGED    Details   sertraline (ZOLOFT) 100 mg tablet Take  by mouth daily. cetirizine (ZYRTEC) 10 mg tablet Take  by mouth.            Activity: Activity as tolerated and no driving for today  Diet: Regular Diet  Wound Care: None needed    Follow-up with surgery clinic in 1 week.   Follow-up tests/labs hepatic function panel in a few days as already ordered and reviewed with patient    Signed:  Jace Sadler MD  [facility]  12/23/2021  12:29 PM .

## 2021-12-23 NOTE — PROGRESS NOTES
GI Progress Note [facility])  NAME:Roderick Canseco ZVT: DT   ATTG: Teena Peres MD  PCP: Praveen Hartman MD  Date/Time:  2021 10:05 AM   Assessment:     Ampullary stenosis- treated with ERCP sphincterotomy  Elevated liver enzymes- c/w biliary intervention; ready balloon passage through sphincterotomy noted     Plan:     Continue dietary advancement  Recheck liver enzymes in 1 wk  Consider for d/c home today     Subjective:   Discussed with RN events overnight. Tolerating diet without increased pain. No N/V    Complaint Y/N Description   Abdominal Pain y At incisions only   Hematemesis     Hematochezia     Melena     Constipation     Diarrhea n    Dyspepsia     Dysphagia n    Jaundiced n    Nausea/vomiting n      Review of Systems:  Symptom Y/N Comments  Symptom Y/N Comments   Fever/Chills n   Chest Pain n    Cough n   Headaches n    Sputum n   Joint Pain n    SOB/GUSTAFSON n   Pruritis/Rash n    Tolerating Diet y   Other       Could NOT obtain due to:      Objective:   VITALS:   Last 24hrs VS reviewed since prior progress note. Most recent are:  Visit Vitals  BP (!) 140/90   Pulse 71   Temp 98.4 °F (36.9 °C)   Resp 18   Ht 5' 9\" (1.753 m)   Wt 85.3 kg (188 lb)   SpO2 98%   BMI 27.76 kg/m²       Intake/Output Summary (Last 24 hours) at 2021 1005  Last data filed at 2021 0416  Gross per 24 hour   Intake 250 ml   Output 1550 ml   Net -1300 ml     PHYSICAL EXAM:  General: WD, WN. Alert, cooperative, no acute distress    HEENT: NC, Atraumatic. PERRL. Anicteric sclerae. Lungs:  CTA Bilaterally. No Wheezing/Rhonchi/Rales. Heart:  Regular  rhythm,  No murmur/Rub/Gallops  Abdomen: Soft, Non distended, Non tender. +BS  Extremities: No c/c/e  Neurologic:  CN 2-12 gi, A/O X 3. No acute neurological distress   Psych:   Good insight. Not anxious nor agitated.     Lab and Radiology Data Reviewed: (see below)    Medications Reviewed: (see below)  PMH/SH reviewed - no change compared to H&P  ________________________________________________________________________  Total time spent with patient: 15 minutes ________________________________________________________________________  Care Plan discussed with:  Patient y   Family     RN               Consultant:  Darien Salguero MD     Procedures: see electronic medical records for all procedures/Xrays and details which were not copied into this note but were reviewed prior to creation of Plan. LABS:  Recent Labs     12/23/21  0405 12/21/21  0020   WBC 8.9 13.6*   HGB 13.9 13.6   HCT 43.2 42.6    198     Recent Labs     12/23/21  0405 12/21/21  0020    140   K 3.5 3.5    105   CO2 27 28   BUN 5* 14   CREA 0.93 1.13   * 185*   CA 8.8 9.5     Recent Labs     12/23/21  0405 12/21/21  0020   * 104   TP 6.6 7.1   ALB 3.3* 4.0   GLOB 3.3 3.1   LPSE >3,000* 160     No results for input(s): INR, PTP, APTT, INREXT in the last 72 hours. No results for input(s): FE, TIBC, PSAT, FERR in the last 72 hours. No results found for: FOL, RBCF  No results for input(s): PH, PCO2, PO2 in the last 72 hours. No results for input(s): CPK, CKMB in the last 72 hours.     No lab exists for component: TROPONINI  Lab Results   Component Value Date/Time    Color YELLOW/STRAW 12/21/2021 03:45 AM    Appearance TURBID (A) 12/21/2021 03:45 AM    Specific gravity 1.024 12/21/2021 03:45 AM    pH (UA) 8.0 12/21/2021 03:45 AM    Protein 30 (A) 12/21/2021 03:45 AM    Glucose 250 (A) 12/21/2021 03:45 AM    Ketone TRACE (A) 12/21/2021 03:45 AM    Bilirubin Negative 12/21/2021 03:45 AM    Urobilinogen 1.0 12/21/2021 03:45 AM    Nitrites Negative 12/21/2021 03:45 AM    Leukocyte Esterase Negative 12/21/2021 03:45 AM    Epithelial cells FEW 12/21/2021 03:45 AM    Bacteria Negative 12/21/2021 03:45 AM    WBC 0-4 12/21/2021 03:45 AM    RBC 0-5 12/21/2021 03:45 AM       MEDICATIONS:  Current Facility-Administered Medications   Medication Dose Route Frequency    sodium chloride (NS) flush 5-40 mL  5-40 mL IntraVENous Q8H    sodium chloride (NS) flush 5-40 mL  5-40 mL IntraVENous PRN    alcohol 62% (NOZIN) nasal  1 Ampule  1 Ampule Topical Q12H    oxyCODONE IR (ROXICODONE) tablet 5-10 mg  5-10 mg Oral Q4H PRN    hydrALAZINE (APRESOLINE) 20 mg/mL injection 10 mg  10 mg IntraVENous Q6H PRN    acetaminophen (TYLENOL) tablet 650 mg  650 mg Oral Q6H PRN    dextrose 5% - 0.45% NaCl with KCl 20 mEq/L infusion  100 mL/hr IntraVENous CONTINUOUS    HYDROmorphone (DILAUDID) injection 0.5 mg  0.5 mg IntraVENous Q6H PRN    LORazepam (ATIVAN) injection 1 mg  1 mg IntraVENous Q6H PRN    ondansetron (ZOFRAN) injection 4 mg  4 mg IntraVENous Q6H PRN    levoFLOXacin (LEVAQUIN) 750 mg in D5W IVPB  750 mg IntraVENous Q24H

## 2021-12-23 NOTE — PROGRESS NOTES
End of Shift Note    Bedside shift change report given to Seema Tucker RN (oncoming nurse) by Johnathon Rawls LPN (offgoing nurse). Report included the following information SBAR, Kardex, Procedure Summary, Intake/Output, MAR and Recent Results    Shift worked:  night     Shift summary and any significant changes:     Pt was given oxycodone PRN for pain management. Pt has elevated BPs, BP medicine requires a systolic BP greater than 652 checked manually. Concerns for physician to address:  Elevated BPs and pain management. Zone phone for oncoming shift:   [phone]       Activity:  Activity Level: Up ad joseline  Number times ambulated in hallways past shift: 0  Number of times OOB to chair past shift: 0      Cardiac:   Cardiac Monitoring: No     Cardiac Rhythm: Sinus Rhythm    Access:   Current line(s): PIV     Genitourinary:   Urinary status: voiding    Respiratory:   O2 Device: None (Room air)  Chronic home O2 use?: NO  Incentive spirometer at bedside: YES  Actual Volume (ml): 1500 ml  GI:  Last Bowel Movement Date: 12/20/21  Current diet:  ADULT DIET Clear Liquid  Passing flatus: YES   Tolerating current diet: Clear liquid diet       Pain Management:   Patient states pain is manageable on current regimen: YES    Skin:  Gab Score: 22  Interventions: increase time out of bed    Patient Safety:  Fall Score:  Total Score: 1  Interventions: gripper socks and pt to call before getting OOB  High Fall Risk: Yes    Length of Stay:  Expected LOS: - - -  Actual LOS: [facility]

## 2022-01-06 ENCOUNTER — OFFICE VISIT (OUTPATIENT)
Dept: SURGERY | Age: 42
End: 2022-01-06
Payer: COMMERCIAL

## 2022-01-06 VITALS
SYSTOLIC BLOOD PRESSURE: 116 MMHG | WEIGHT: 192 LBS | BODY MASS INDEX: 28.44 KG/M2 | DIASTOLIC BLOOD PRESSURE: 78 MMHG | HEART RATE: 73 BPM | OXYGEN SATURATION: 96 % | TEMPERATURE: 97.5 F | HEIGHT: 69 IN | RESPIRATION RATE: 16 BRPM

## 2022-01-06 DIAGNOSIS — Z09 POSTOPERATIVE EXAMINATION: Primary | ICD-10-CM

## 2022-01-06 PROCEDURE — 99024 POSTOP FOLLOW-UP VISIT: CPT | Performed by: SURGERY

## 2022-01-06 NOTE — PROGRESS NOTES
Identified pt with two pt identifiers(name and ). Reviewed record in preparation for visit and have obtained necessary documentation. All patient medications has been reviewed. Chief Complaint   Patient presents with    Surgical Follow-up     S/P Laparoscopic Cholecystectomy w/Gram 21       Health Maintenance Due   Topic    Hepatitis C Screening     COVID-19 Vaccine (1)    DTaP/Tdap/Td series (1 - Tdap)    Lipid Screen     Flu Vaccine (1)       Vitals:    22 1416   BP: 116/78   Pulse: 73   Resp: 16   Temp: 97.5 °F (36.4 °C)   TempSrc: Temporal   SpO2: 96%   Weight: 87.1 kg (192 lb)   Height: 5' 9\" (1.753 m)   PainSc:   0 - No pain       4. Have you been to the ER, urgent care clinic since your last visit? Hospitalized since your last visit? No    5. Have you seen or consulted any other health care providers outside of the [address] [place] [address] since your last visit? Include any pap smears or colon screening. No      Patient is accompanied by self I have received verbal consent from Joesph Davidson to discuss any/all medical information while they are present in the room.

## 2022-01-06 NOTE — PROGRESS NOTES
Postop Progress Note    Subjective    Pink Goodness presents to the office for postop follow up. Objective    Visit Vitals  /78 (BP 1 Location: Right upper arm, BP Patient Position: Sitting, BP Cuff Size: Adult)   Pulse 73   Temp 97.5 °F (36.4 °C) (Temporal)   Resp 16   Ht 5' 9\" (1.753 m)   Wt 87.1 kg (192 lb)   SpO2 96%   BMI 28.35 kg/m²     General: alert, cooperative and no distress  Incision: healing well    Assessment  Doing well postoperatively. Plan  1. Continue any current medications  2. Wound care discussed  3. Pt is to increase activities as tolerated  4. Usual diet  5.  Follow up: as needed    Electronically signed by Estefania Soria MD on 1/6/2022 at 3:02 PM

## 2022-03-18 PROBLEM — K81.0 ACUTE CHOLECYSTITIS: Status: ACTIVE | Noted: 2021-12-21

## 2022-03-18 PROBLEM — K83.1 AMPULLARY STENOSIS: Status: ACTIVE | Noted: 2021-12-23

## 2022-03-20 PROBLEM — K80.50 CHOLEDOCHOLITHIASIS: Status: ACTIVE | Noted: 2021-12-23

## 2023-05-18 RX ORDER — CETIRIZINE HYDROCHLORIDE 10 MG/1
TABLET ORAL
COMMUNITY

## 2023-05-18 RX ORDER — SERTRALINE HYDROCHLORIDE 100 MG/1
TABLET, FILM COATED ORAL DAILY
COMMUNITY

## (undated) DEVICE — TRANSFER SET 3": Brand: MEDLINE INDUSTRIES, INC.

## (undated) DEVICE — REM POLYHESIVE ADULT PATIENT RETURN ELECTRODE: Brand: VALLEYLAB

## (undated) DEVICE — TROCAR: Brand: KII FIOS FIRST ENTRY

## (undated) DEVICE — KIT,1200CC CANISTER,3/16"X6' TUBING: Brand: MEDLINE INDUSTRIES, INC.

## (undated) DEVICE — HYPODERMIC SAFETY NEEDLE: Brand: MAGELLAN

## (undated) DEVICE — RETRIEVAL BALLOON CATHETER: Brand: EXTRACTOR™ PRO RX

## (undated) DEVICE — SOL IRRIGATION INJ NACL 0.9% 500ML BTL

## (undated) DEVICE — TROCAR: Brand: KII® SLEEVE

## (undated) DEVICE — CATHETER IV 14GA L2IN POLYUR STR ORNG HUB SFTY RADPQ DISP

## (undated) DEVICE — APPLIER CLP M/L SHFT DIA5MM 15 LIG LIGAMAX 5

## (undated) DEVICE — GLOVE ORTHO 7 1/2   MSG9475

## (undated) DEVICE — BITE BLK ENDOSCP AD 54FR GRN POLYETH ENDOSCP W STRP SLD

## (undated) DEVICE — GUIDEWIRE LOCKING DEVICE BIOPSY CAP

## (undated) DEVICE — GLOVE ORANGE PI 7 1/2   MSG9075

## (undated) DEVICE — CANNULATING SPHINCTEROTOME: Brand: JAGTOME™ REVOLUTION RX

## (undated) DEVICE — Device: Brand: ENDO SMARTCAP

## (undated) DEVICE — SET CHOLANGIOGRAPHY 4FR L60CM W/ ARW KARLAN BLLN CATH CRV

## (undated) DEVICE — GENERAL LAPAROSCOPY-MRMC: Brand: MEDLINE INDUSTRIES, INC.

## (undated) DEVICE — Device

## (undated) DEVICE — GARMENT,MEDLINE,DVT,INT,CALF,MED, GEN2: Brand: MEDLINE

## (undated) DEVICE — E-Z CLEAN, PTFE COATED, ELECTROSURGICAL LAPAROSCOPIC ELECTRODE, L-HOOK, 33 CM., SINGLE-USE, FOR USE WITH HAND CONTROL PENCIL: Brand: MEGADYNE

## (undated) DEVICE — SUTURE MCRYL SZ 4-0 L27IN ABSRB UD L19MM PS-2 1/2 CIR PRIM Y426H

## (undated) DEVICE — BAG SPEC REM 224ML W4XL6IN DIA10MM 1 HND GYN DISP ENDOPCH

## (undated) DEVICE — C-ARM: Brand: UNBRANDED

## (undated) DEVICE — LAPAROSCOPIC TROCAR SLEEVE/SINGLE USE: Brand: KII® OPTICAL ACCESS SYSTEM

## (undated) DEVICE — CATHETER CHOLGM 4.5FR L18IN W/ MTL SUPP TB

## (undated) DEVICE — TROCAR SITE CLOSURE DEVICE: Brand: ENDO CLOSE